# Patient Record
Sex: FEMALE | Race: WHITE | NOT HISPANIC OR LATINO | Employment: OTHER | URBAN - METROPOLITAN AREA
[De-identification: names, ages, dates, MRNs, and addresses within clinical notes are randomized per-mention and may not be internally consistent; named-entity substitution may affect disease eponyms.]

---

## 2017-05-09 ENCOUNTER — APPOINTMENT (OUTPATIENT)
Dept: RADIOLOGY | Facility: MEDICAL CENTER | Age: 52
End: 2017-05-09
Attending: EMERGENCY MEDICINE
Payer: MEDICARE

## 2017-05-09 ENCOUNTER — RESOLUTE PROFESSIONAL BILLING HOSPITAL PROF FEE (OUTPATIENT)
Dept: HOSPITALIST | Facility: MEDICAL CENTER | Age: 52
End: 2017-05-09
Payer: MEDICARE

## 2017-05-09 ENCOUNTER — HOSPITAL ENCOUNTER (OUTPATIENT)
Facility: MEDICAL CENTER | Age: 52
End: 2017-05-11
Attending: EMERGENCY MEDICINE | Admitting: INTERNAL MEDICINE
Payer: MEDICARE

## 2017-05-09 LAB
ANION GAP SERPL CALC-SCNC: 8 MMOL/L (ref 0–11.9)
BASOPHILS # BLD AUTO: 0.5 % (ref 0–1.8)
BASOPHILS # BLD: 0.03 K/UL (ref 0–0.12)
BNP SERPL-MCNC: 66 PG/ML (ref 0–100)
BUN SERPL-MCNC: 13 MG/DL (ref 8–22)
CALCIUM SERPL-MCNC: 8.8 MG/DL (ref 8.5–10.5)
CHLORIDE SERPL-SCNC: 96 MMOL/L (ref 96–112)
CO2 SERPL-SCNC: 30 MMOL/L (ref 20–33)
CREAT SERPL-MCNC: 1.19 MG/DL (ref 0.5–1.4)
EOSINOPHIL # BLD AUTO: 0.23 K/UL (ref 0–0.51)
EOSINOPHIL NFR BLD: 3.7 % (ref 0–6.9)
ERYTHROCYTE [DISTWIDTH] IN BLOOD BY AUTOMATED COUNT: 48.9 FL (ref 35.9–50)
GFR SERPL CREATININE-BSD FRML MDRD: 48 ML/MIN/1.73 M 2
GLUCOSE SERPL-MCNC: 199 MG/DL (ref 65–99)
HCT VFR BLD AUTO: 31.3 % (ref 37–47)
HGB BLD-MCNC: 10.3 G/DL (ref 12–16)
IMM GRANULOCYTES # BLD AUTO: 0.03 K/UL (ref 0–0.11)
IMM GRANULOCYTES NFR BLD AUTO: 0.5 % (ref 0–0.9)
LYMPHOCYTES # BLD AUTO: 1.53 K/UL (ref 1–4.8)
LYMPHOCYTES NFR BLD: 24.4 % (ref 22–41)
MCH RBC QN AUTO: 28.5 PG (ref 27–33)
MCHC RBC AUTO-ENTMCNC: 32.9 G/DL (ref 33.6–35)
MCV RBC AUTO: 86.7 FL (ref 81.4–97.8)
MONOCYTES # BLD AUTO: 0.42 K/UL (ref 0–0.85)
MONOCYTES NFR BLD AUTO: 6.7 % (ref 0–13.4)
NEUTROPHILS # BLD AUTO: 4.03 K/UL (ref 2–7.15)
NEUTROPHILS NFR BLD: 64.2 % (ref 44–72)
NRBC # BLD AUTO: 0 K/UL
NRBC BLD AUTO-RTO: 0 /100 WBC
PLATELET # BLD AUTO: 275 K/UL (ref 164–446)
PMV BLD AUTO: 8.9 FL (ref 9–12.9)
POTASSIUM SERPL-SCNC: 2.5 MMOL/L (ref 3.6–5.5)
RBC # BLD AUTO: 3.61 M/UL (ref 4.2–5.4)
SODIUM SERPL-SCNC: 134 MMOL/L (ref 135–145)
TROPONIN I SERPL-MCNC: <0.01 NG/ML (ref 0–0.04)
WBC # BLD AUTO: 6.3 K/UL (ref 4.8–10.8)

## 2017-05-09 PROCEDURE — 99285 EMERGENCY DEPT VISIT HI MDM: CPT

## 2017-05-09 PROCEDURE — 71010 DX-CHEST-PORTABLE (1 VIEW): CPT

## 2017-05-09 PROCEDURE — 84484 ASSAY OF TROPONIN QUANT: CPT

## 2017-05-09 PROCEDURE — 80048 BASIC METABOLIC PNL TOTAL CA: CPT

## 2017-05-09 PROCEDURE — 83880 ASSAY OF NATRIURETIC PEPTIDE: CPT

## 2017-05-09 PROCEDURE — 85025 COMPLETE CBC W/AUTO DIFF WBC: CPT

## 2017-05-09 PROCEDURE — 93005 ELECTROCARDIOGRAM TRACING: CPT | Performed by: EMERGENCY MEDICINE

## 2017-05-09 NOTE — IP AVS SNAPSHOT
" Home Care Instructions                                                                                                                  Name:Nohemy Baumann  Medical Record Number:[id number]  CSN: [id number]    YOB: 1965   Age: 52 y.o.  Sex: female  HT:1.6 m (5' 2.99\") WT: 82.4 kg (181 lb 10.5 oz)          Admit Date: 5/9/2017     Discharge Date:   Today's Date: 5/11/2017  Attending Doctor:  Brooks Rodriguez M.D.                  Allergies:  Nitroglycerin and Vancomycin            Discharge Instructions       Discharge Instructions    Discharged to home by car with relative. Discharged via wheelchair, hospital escort: Yes.  Special equipment needed: Not Applicable    Be sure to schedule a follow-up appointment with your primary care doctor or any specialists as instructed.     Discharge Plan:   Diet Plan: Discussed  Activity Level: Discussed  Confirmed Follow up Appointment: Appointment Scheduled  Confirmed Symptoms Management: Discussed  Medication Reconciliation Updated: Yes  Influenza Vaccine Indication: Patient Refuses    I understand that a diet low in cholesterol, fat, and sodium is recommended for good health. Unless I have been given specific instructions below for another diet, I accept this instruction as my diet prescription.   Other diet: Cardiac    Special Instructions: None    · Is patient discharged on Warfarin / Coumadin?   No     · Is patient Post Blood Transfusion?  No    Chest Pain, Nonspecific  It is often hard to give a specific diagnosis for the cause of chest pain. There is always a chance that your pain could be related to something serious, like a heart attack or a blood clot in the lungs. You need to follow up with your caregiver for further evaluation. More lab tests or other studies such as X-rays, electrocardiography, stress testing, or cardiac imaging may be needed to find the cause of your pain.  Most of the time, nonspecific chest pain improves within 2 to 3 days with rest and " mild pain medicine. For the next few days, avoid physical exertion or activities that bring on pain. Do not smoke. Avoid drinking alcohol. Call your caregiver for routine follow-up as advised.   SEEK IMMEDIATE MEDICAL CARE IF:  · You develop increased chest pain or pain that radiates to the arm, neck, jaw, back, or abdomen.   · You develop shortness of breath, increased coughing, or you start coughing up blood.   · You have severe back or abdominal pain, nausea, or vomiting.   · You develop severe weakness, fainting, fever, or chills.   Document Released: 12/18/2006 Document Revised: 03/11/2013 Document Reviewed: 06/06/2008  [email or web address]® Patient Information ©2013 [employer].    Depression / Suicide Risk    As you are discharged from this [facility] facility, it is important to learn how to keep safe from harming yourself.    Recognize the warning signs:  · Abrupt changes in personality, positive or negative- including increase in energy   · Giving away possessions  · Change in eating patterns- significant weight changes-  positive or negative  · Change in sleeping patterns- unable to sleep or sleeping all the time   · Unwillingness or inability to communicate  · Depression  · Unusual sadness, discouragement and loneliness  · Talk of wanting to die  · Neglect of personal appearance   · Rebelliousness- reckless behavior  · Withdrawal from people/activities they love  · Confusion- inability to concentrate     If you or a loved one observes any of these behaviors or has concerns about self-harm, here's what you can do:  · Talk about it- your feelings and reasons for harming yourself  · Remove any means that you might use to hurt yourself (examples: pills, rope, extension cords, firearm)  · Get professional help from the community (Mental Health, Substance Abuse, psychological counseling)  · Do not be alone:Call your Safe Contact- someone whom you trust who will be there for you.  · Call your local CRISIS HOTLINE  005-1860 or 199-497-6866  · Call your local Children's Mobile Crisis Response Team [place] (190) 348-4010 or www.LeadiD  · Call the toll free National Suicide Prevention Hotlines   · National Suicide Prevention Lifeline 867-515-JYHI (0111)  · National Hope Line Network 800-SUICIDE (336-0939)        Follow-up Information     1. Follow up with Dora Varela M.D.. Go on 5/15/2017.    Specialty:  Internal Medicine    Why:  PLEASE ARRIVE AT 8:30AM FOR A 9:00AM APPOINTMENT. THANK YOU    Contact information    [address]  [address]  [place] [address]  [phone]          2. Follow up with Abhinav Govea M.D..    Specialty:  Nephrology    Why:  Patient is already seen at dialysis, and will be seen by N.P. on 5/16/2017.    Contact information    [address] [place] [address]  [phone]           Discharge Medication Instructions:    Below are the medications your physician expects you to take upon discharge:    Review all your home medications and newly ordered medications with your doctor and/or pharmacist. Follow medication instructions as directed by your doctor and/or pharmacist.    Please keep your medication list with you and share with your physician.               Medication List      START taking these medications        Instructions    Morning Afternoon Evening Bedtime    aspirin 81 MG tablet   Last time this was given:  325 mg on 5/11/2017  9:36 AM   Next Dose Due:  5/12 AM        Take 4 Tabs by mouth every day.   Dose:  325 mg                          CHANGE how you take these medications        Instructions    Morning Afternoon Evening Bedtime    NOVOLOG (insulin aspart) 100 UNIT/ML Sopn injection   What changed:    - how to take this  - when to take this  - additional instructions   Commonly known as:  NOVOLOG FLEXPEN   Next Dose Due:  Prior to meals        Use TID AC per sliding scale.  70   - 150  mg/dL =      0 Units  151 - 200  mg/dL =    2 Units  201 - 250  mg/dL =    3 Units   251 - 300  mg/dL  =   5 Units  301 - 350  mg/dL  =   6 Units  351 - 400 mg/dL   =   8 Units  Over 400 mg/dL   =   9 Units                          CONTINUE taking these medications        Instructions    Morning Afternoon Evening Bedtime    LEVEMIR FLEXPEN 100 UNIT/ML Sopn injection   Generic drug:  insulin detemir   Next Dose Due:  5/11 PM        Inject 20 Units as instructed 2 times a day.   Dose:  20 Units                             Where to Get Your Medications      Information about where to get these medications is not yet available     ! Ask your nurse or doctor about these medications    - aspirin 81 MG tablet  - NOVOLOG (insulin aspart) 100 UNIT/ML Sopn injection            Instructions           Diet / Nutrition:    Follow any diet instructions given to you by your doctor or the dietician, including how much salt (sodium) you are allowed each day.    If you are overweight, talk to your doctor about a weight reduction plan.    Activity:    Remain physically active following your doctor's instructions about exercise and activity.    Rest often.     Any time you become even a little tired or short of breath, SIT DOWN and rest.    Worsening Symptoms:    Report any of the following signs and symptoms to the doctor's office immediately:    *Pain of jaw, arm, or neck  *Chest pain not relieved by medication                               *Dizziness or loss of consciousness  *Difficulty breathing even when at rest   *More tired than usual                                       *Bleeding drainage or swelling of surgical site  *Swelling of feet, ankles, legs or stomach                 *Fever (>100ºF)  *Pink or blood tinged sputum  *Weight gain (3lbs/day or 5lbs /week)           *Shock from internal defibrillator (if applicable)  *Palpitations or irregular heartbeats                *Cool and/or numb extremities    Stroke Awareness    Common Risk Factors for Stroke include:    Age  Atrial Fibrillation  Carotid Artery  Stenosis  Diabetes Mellitus  Excessive alcohol consumption  High blood pressure  Overweight   Physical inactivity  Smoking    Warning signs and symptoms of a stroke include:    *Sudden numbness or weakness of the face, arm or leg (especially on one side of the body).  *Sudden confusion, trouble speaking or understanding.  *Sudden trouble seeing in one or both eyes.  *Sudden trouble walking, dizziness, loss of balance or coordination.Sudden severe headache with no known cause.    It is very important to get treatment quickly when a stroke occurs. If you experience any of the above warning signs, call 911 immediately.                   Disclaimer         Quit Smoking / Tobacco Use:    I understand the use of any tobacco products increases my chance of suffering from future heart disease or stroke and could cause other illnesses which may shorten my life. Quitting the use of tobacco products is the single most important thing I can do to improve my health. For further information on smoking / tobacco cessation call a Toll Free Quit Line at 1-864.463.8364 (*National Cancer [place]) or 1-779.790.6868 (American Lung Association) or you can access the web based program at www.lungDreamzer Games.org.    [place] Tobacco Users Help Line:  (689) 934-2839       Toll Free: 1-861.585.3182  Quit Tobacco Program [facility] Management Services (212)881-5469    Crisis Hotline:    [place] Crisis Hotline:  3-203-KWVJAQR or 1-550.290.7097    [place] Crisis Hotline:    1-406.761.6524 or 108-667-3913    Discharge Survey:   Thank you for choosing [facility]. We hope we did everything we could to make your hospital stay a pleasant one. You may be receiving a phone survey and we would appreciate your time and participation in answering the questions. Your input is very valuable to us in our efforts to improve our service to our patients and their families.        My signature on this form indicates that:    1. I have reviewed and understand the  above information.  2. My questions regarding this information have been answered to my satisfaction.  3. I have formulated a plan with my discharge nurse to obtain my prescribed medications for home.                  Disclaimer         __________________________________                     __________       ________                       Patient Signature                                                 Date                    Time

## 2017-05-09 NOTE — IP AVS SNAPSHOT
5/11/2017    Nohemy Baumann  [address] [place] [address]    Dear Nohemy:    [facility] wants to ensure your discharge home is safe and you or your loved ones have had all of your questions answered regarding your care after you leave the hospital.    Below is a list of resources and contact information should you have any questions regarding your hospital stay, follow-up instructions, or active medical symptoms.    Questions or Concerns Regarding… Contact   Medical Questions Related to Your Discharge  (7 days a week, 8am-5pm) Contact a Nurse Care Coordinator   [phone]   Medical Questions Not Related to Your Discharge  (24 hours a day / 7 days a week)  Contact the Nurse Health Line   [phone]    Medications or Discharge Instructions Refer to your discharge packet   or contact your [facility] Primary Care Provider   [phone]   Follow-up Appointment(s) Schedule your appointment via ServiceNow   or contact Scheduling [phone]   Billing Review your statement via ServiceNow  or contact Billing [phone]   Medical Records Review your records via ServiceNow   or contact Medical Records [phone]     You may receive a telephone call within two days of discharge. This call is to make certain you understand your discharge instructions and have the opportunity to have any questions answered. You can also easily access your medical information, test results and upcoming appointments via the ServiceNow free online health management tool. You can learn more and sign up at [employer]/ServiceNow. For assistance setting up your ServiceNow account, please call [phone].    Once again, we want to ensure your discharge home is safe and that you have a clear understanding of any next steps in your care. If you have any questions or concerns, please do not hesitate to contact us, we are here for you. Thank you for choosing [facility] for your healthcare needs.    Sincerely,    Your [facility] Healthcare Team

## 2017-05-09 NOTE — IP AVS SNAPSHOT
" <p align=\"LEFT\"><IMG SRC=\"//[email or web address]/blob$/[email or web address]\" alt=\"Image\" WIDTH=\"50%\" HEIGHT=\"200\" BORDER=\"\"></p>                   Name:Nohemy Baumann  Medical Record Number:[id number]  CSN: [id number]    YOB: 1965   Age: 52 y.o.  Sex: female  HT:1.6 m (5' 2.99\") WT: 82.4 kg (181 lb 10.5 oz)          Admit Date: 5/9/2017     Discharge Date:   Today's Date: 5/11/2017  Attending Doctor:  Brooks Rodriguez M.D.                  Allergies:  Nitroglycerin and Vancomycin          Follow-up Information     1. Follow up with Dora Varela M.D.. Go on 5/15/2017.    Specialty:  Internal Medicine    Why:  PLEASE ARRIVE AT 8:30AM FOR A 9:00AM APPOINTMENT. THANK YOU    Contact information    [address]  [place] [id number] [phone]          2. Follow up with Abhinav Govea M.D..    Specialty:  Nephrology    Why:  Patient is already seen at dialysis, and will be seen by N.P. on 5/16/2017.    Contact information    [facility] [address] [id number] [phone]           Medication List      Take these Medications        Instructions    aspirin 81 MG tablet    Take 4 Tabs by mouth every day.   Dose:  325 mg       LEVEMIR FLEXPEN 100 UNIT/ML Sopn injection   Generic drug:  insulin detemir    Inject 20 Units as instructed 2 times a day.   Dose:  20 Units       NOVOLOG (insulin aspart) 100 UNIT/ML Sopn injection   What changed:    - how to take this  - when to take this  - additional instructions   Commonly known as:  NOVOLOG FLEXPEN    Use TID AC per sliding scale.  70   - 150  mg/dL =      0 Units  151 - 200  mg/dL =    2 Units  201 - 250  mg/dL =    3 Units  251 - 300  mg/dL  =   5 Units  301 - 350  mg/dL  =   6 Units  351 - 400 mg/dL   =   8 Units  Over 400 mg/dL   =   9 Units         "

## 2017-05-09 NOTE — IP AVS SNAPSHOT
[email or web address] Access Code: [email or web address]  Expires: 6/10/2017  3:54 PM    Your email address is not on file at [facility].  Email Addresses are required for you to sign up for [email or web address], please contact [phone] to verify your personal information and to provide your email address prior to attempting to register for [email or web address].    Nohemy Pastor  [address] [place] [address]    apta.me  A secure, online tool to manage your health information     [facility]’s apta.me® is a secure, online tool that connects you to your personalized health information from the privacy of your home -- day or night - making it very easy for you to manage your healthcare. Once the activation process is completed, you can even access your medical information using the apta.me dheeraj, which is available for free in the Apple Dheeraj store or Google Play store.     To learn more about apta.me, visit [email or web address]    There are two levels of access available (as shown below):   My Chart Features  [facility] Primary Care Doctor [facility]  Specialists [facility]  Urgent  Care Non-[facility] Primary Care Doctor   Email your healthcare team securely and privately 24/7 X X X    Manage appointments: schedule your next appointment; view details of past/upcoming appointments X      Request prescription refills. X      View recent personal medical records, including lab and immunizations X X X X   View health record, including health history, allergies, medications X X X X   Read reports about your outpatient visits, procedures, consult and ER notes X X X X   See your discharge summary, which is a recap of your hospital and/or ER visit that includes your diagnosis, lab results, and care plan X X  X     How to register for [employer]:  Once your e-mail address has been verified, follow the following steps to sign up for [email or web address].     1. Go to  [email or web address]  2. Click on the Sign Up Now box, which takes you to the New Member Sign Up page. You will need to  provide the following information:  a. Enter your JetSuite Access Code exactly as it appears at the top of this page. (You will not need to use this code after you’ve completed the sign-up process. If you do not sign up before the expiration date, you must request a new code.)   b. Enter your date of birth.   c. Enter your home email address.   d. Click Submit, and follow the next screen’s instructions.  3. Create a JetSuite ID. This will be your JetSuite login ID and cannot be changed, so think of one that is secure and easy to remember.  4. Create a JetSuite password. You can change your password at any time.  5. Enter your Password Reset Question and Answer. This can be used at a later time if you forget your password.   6. Enter your e-mail address. This allows you to receive e-mail notifications when new information is available in JetSuite.  7. Click Sign Up. You can now view your health information.    For assistance activating your JetSuite account, call [phone]

## 2017-05-10 ENCOUNTER — APPOINTMENT (OUTPATIENT)
Dept: RADIOLOGY | Facility: MEDICAL CENTER | Age: 52
End: 2017-05-10
Attending: INTERNAL MEDICINE
Payer: MEDICARE

## 2017-05-10 PROBLEM — R07.9 CHEST PAIN: Status: ACTIVE | Noted: 2017-05-10

## 2017-05-10 LAB
GLUCOSE BLD-MCNC: 147 MG/DL (ref 65–99)
GLUCOSE BLD-MCNC: 157 MG/DL (ref 65–99)
GLUCOSE BLD-MCNC: 160 MG/DL (ref 65–99)
GLUCOSE BLD-MCNC: 175 MG/DL (ref 65–99)
LV EJECT FRACT  99904: 65
LV EJECT FRACT MOD 2C 99903: 67.06
LV EJECT FRACT MOD 4C 99902: 72.69
LV EJECT FRACT MOD BP 99901: 68.48
TROPONIN I SERPL-MCNC: 0.01 NG/ML (ref 0–0.04)

## 2017-05-10 PROCEDURE — A9502 TC99M TETROFOSMIN: HCPCS

## 2017-05-10 PROCEDURE — 700111 HCHG RX REV CODE 636 W/ 250 OVERRIDE (IP): Performed by: EMERGENCY MEDICINE

## 2017-05-10 PROCEDURE — 84484 ASSAY OF TROPONIN QUANT: CPT

## 2017-05-10 PROCEDURE — 82962 GLUCOSE BLOOD TEST: CPT

## 2017-05-10 PROCEDURE — 700111 HCHG RX REV CODE 636 W/ 250 OVERRIDE (IP)

## 2017-05-10 PROCEDURE — G0378 HOSPITAL OBSERVATION PER HR: HCPCS

## 2017-05-10 PROCEDURE — 93306 TTE W/DOPPLER COMPLETE: CPT

## 2017-05-10 PROCEDURE — A9270 NON-COVERED ITEM OR SERVICE: HCPCS | Performed by: INTERNAL MEDICINE

## 2017-05-10 PROCEDURE — 99220 PR INITIAL OBSERVATION CARE,LEVL III: CPT | Performed by: INTERNAL MEDICINE

## 2017-05-10 PROCEDURE — 700111 HCHG RX REV CODE 636 W/ 250 OVERRIDE (IP): Performed by: INTERNAL MEDICINE

## 2017-05-10 PROCEDURE — 96372 THER/PROPH/DIAG INJ SC/IM: CPT | Mod: XU

## 2017-05-10 PROCEDURE — 700102 HCHG RX REV CODE 250 W/ 637 OVERRIDE(OP): Performed by: INTERNAL MEDICINE

## 2017-05-10 PROCEDURE — 96374 THER/PROPH/DIAG INJ IV PUSH: CPT | Mod: XU

## 2017-05-10 PROCEDURE — 93306 TTE W/DOPPLER COMPLETE: CPT | Mod: 26 | Performed by: INTERNAL MEDICINE

## 2017-05-10 PROCEDURE — 36415 COLL VENOUS BLD VENIPUNCTURE: CPT

## 2017-05-10 PROCEDURE — 94760 N-INVAS EAR/PLS OXIMETRY 1: CPT

## 2017-05-10 PROCEDURE — 96376 TX/PRO/DX INJ SAME DRUG ADON: CPT | Mod: XU

## 2017-05-10 RX ORDER — POLYETHYLENE GLYCOL 3350 17 G/17G
1 POWDER, FOR SOLUTION ORAL
Status: DISCONTINUED | OUTPATIENT
Start: 2017-05-10 | End: 2017-05-11 | Stop reason: HOSPADM

## 2017-05-10 RX ORDER — AMOXICILLIN 250 MG
2 CAPSULE ORAL 2 TIMES DAILY
Status: DISCONTINUED | OUTPATIENT
Start: 2017-05-10 | End: 2017-05-11 | Stop reason: HOSPADM

## 2017-05-10 RX ORDER — ASPIRIN 300 MG/1
300 SUPPOSITORY RECTAL DAILY
Status: DISCONTINUED | OUTPATIENT
Start: 2017-05-10 | End: 2017-05-11 | Stop reason: HOSPADM

## 2017-05-10 RX ORDER — BISACODYL 10 MG
10 SUPPOSITORY, RECTAL RECTAL
Status: DISCONTINUED | OUTPATIENT
Start: 2017-05-10 | End: 2017-05-11 | Stop reason: HOSPADM

## 2017-05-10 RX ORDER — INSULIN GLARGINE 100 [IU]/ML
20 INJECTION, SOLUTION SUBCUTANEOUS EVERY EVENING
Status: DISCONTINUED | OUTPATIENT
Start: 2017-05-10 | End: 2017-05-11 | Stop reason: HOSPADM

## 2017-05-10 RX ORDER — DEXTROSE MONOHYDRATE 25 G/50ML
25 INJECTION, SOLUTION INTRAVENOUS
Status: DISCONTINUED | OUTPATIENT
Start: 2017-05-10 | End: 2017-05-11 | Stop reason: HOSPADM

## 2017-05-10 RX ORDER — HEPARIN SODIUM 5000 [USP'U]/ML
5000 INJECTION, SOLUTION INTRAVENOUS; SUBCUTANEOUS EVERY 8 HOURS
Status: DISCONTINUED | OUTPATIENT
Start: 2017-05-10 | End: 2017-05-11 | Stop reason: HOSPADM

## 2017-05-10 RX ORDER — ASPIRIN 81 MG/1
324 TABLET, CHEWABLE ORAL DAILY
Status: DISCONTINUED | OUTPATIENT
Start: 2017-05-10 | End: 2017-05-11 | Stop reason: HOSPADM

## 2017-05-10 RX ORDER — ONDANSETRON 2 MG/ML
4 INJECTION INTRAMUSCULAR; INTRAVENOUS EVERY 4 HOURS PRN
Status: DISCONTINUED | OUTPATIENT
Start: 2017-05-10 | End: 2017-05-11 | Stop reason: HOSPADM

## 2017-05-10 RX ORDER — ASPIRIN 325 MG
325 TABLET ORAL DAILY
Status: DISCONTINUED | OUTPATIENT
Start: 2017-05-10 | End: 2017-05-11 | Stop reason: HOSPADM

## 2017-05-10 RX ORDER — REGADENOSON 0.08 MG/ML
INJECTION, SOLUTION INTRAVENOUS
Status: COMPLETED
Start: 2017-05-10 | End: 2017-05-10

## 2017-05-10 RX ORDER — ONDANSETRON 2 MG/ML
4 INJECTION INTRAMUSCULAR; INTRAVENOUS ONCE
Status: COMPLETED | OUTPATIENT
Start: 2017-05-10 | End: 2017-05-10

## 2017-05-10 RX ORDER — POTASSIUM CHLORIDE 20 MEQ/1
40 TABLET, EXTENDED RELEASE ORAL 2 TIMES DAILY
Status: COMPLETED | OUTPATIENT
Start: 2017-05-10 | End: 2017-05-10

## 2017-05-10 RX ADMIN — ASPIRIN 325 MG: 325 TABLET, COATED ORAL at 08:20

## 2017-05-10 RX ADMIN — ONDANSETRON 4 MG: 2 INJECTION, SOLUTION INTRAMUSCULAR; INTRAVENOUS at 00:45

## 2017-05-10 RX ADMIN — STANDARDIZED SENNA CONCENTRATE AND DOCUSATE SODIUM 2 TABLET: 8.6; 5 TABLET, FILM COATED ORAL at 04:35

## 2017-05-10 RX ADMIN — HEPARIN SODIUM 5000 UNITS: 5000 INJECTION, SOLUTION INTRAVENOUS; SUBCUTANEOUS at 20:01

## 2017-05-10 RX ADMIN — INSULIN LISPRO 2 UNITS: 100 INJECTION, SOLUTION INTRAVENOUS; SUBCUTANEOUS at 20:00

## 2017-05-10 RX ADMIN — POTASSIUM CHLORIDE 40 MEQ: 1500 TABLET, EXTENDED RELEASE ORAL at 08:20

## 2017-05-10 RX ADMIN — INSULIN LISPRO 2 UNITS: 100 INJECTION, SOLUTION INTRAVENOUS; SUBCUTANEOUS at 13:32

## 2017-05-10 RX ADMIN — INSULIN LISPRO 2 UNITS: 100 INJECTION, SOLUTION INTRAVENOUS; SUBCUTANEOUS at 18:36

## 2017-05-10 RX ADMIN — HEPARIN SODIUM 5000 UNITS: 5000 INJECTION, SOLUTION INTRAVENOUS; SUBCUTANEOUS at 04:34

## 2017-05-10 RX ADMIN — REGADENOSON 0.4 MG: 0.08 INJECTION, SOLUTION INTRAVENOUS at 11:43

## 2017-05-10 RX ADMIN — POTASSIUM CHLORIDE 40 MEQ: 1500 TABLET, EXTENDED RELEASE ORAL at 20:02

## 2017-05-10 RX ADMIN — ONDANSETRON 4 MG: 2 INJECTION INTRAMUSCULAR; INTRAVENOUS at 20:09

## 2017-05-10 RX ADMIN — POTASSIUM CHLORIDE 40 MEQ: 1500 TABLET, EXTENDED RELEASE ORAL at 01:05

## 2017-05-10 RX ADMIN — HEPARIN SODIUM 5000 UNITS: 5000 INJECTION, SOLUTION INTRAVENOUS; SUBCUTANEOUS at 13:32

## 2017-05-10 RX ADMIN — INSULIN GLARGINE 20 UNITS: 100 INJECTION, SOLUTION SUBCUTANEOUS at 20:56

## 2017-05-10 RX ADMIN — INSULIN GLARGINE 20 UNITS: 100 INJECTION, SOLUTION SUBCUTANEOUS at 02:08

## 2017-05-10 RX ADMIN — ONDANSETRON 4 MG: 2 INJECTION INTRAMUSCULAR; INTRAVENOUS at 13:30

## 2017-05-10 ASSESSMENT — COPD QUESTIONNAIRES
DURING THE PAST 4 WEEKS HOW MUCH DID YOU FEEL SHORT OF BREATH: MOST  OR ALL OF THE TIME
DO YOU EVER COUGH UP ANY MUCUS OR PHLEGM?: NO/ONLY WITH OCCASIONAL COLDS OR INFECTIONS
COPD SCREENING SCORE: 6
HAVE YOU SMOKED AT LEAST 100 CIGARETTES IN YOUR ENTIRE LIFE: YES

## 2017-05-10 ASSESSMENT — PATIENT HEALTH QUESTIONNAIRE - PHQ9
SUM OF ALL RESPONSES TO PHQ9 QUESTIONS 1 AND 2: 0
SUM OF ALL RESPONSES TO PHQ QUESTIONS 1-9: 0
2. FEELING DOWN, DEPRESSED, IRRITABLE, OR HOPELESS: NOT AT ALL
1. LITTLE INTEREST OR PLEASURE IN DOING THINGS: NOT AT ALL

## 2017-05-10 ASSESSMENT — PAIN SCALES - GENERAL
PAINLEVEL_OUTOF10: 0
PAINLEVEL_OUTOF10: 0

## 2017-05-10 ASSESSMENT — LIFESTYLE VARIABLES
EVER_SMOKED: YES
EVER_SMOKED: YES
ALCOHOL_USE: NO

## 2017-05-10 NOTE — PROGRESS NOTES
Transport arrived to take patient to nuclear medicine for cardiac stress test. Pt. Leaving the floor in wheelchair with nasal canula and oxygen at 2 liters
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
## 2017-05-10 NOTE — CARE PLAN
Problem: Nutritional:  Goal: Patient to verbalize or demonstrate understanding of diet  Outcome: NOT MET  Pt needs reinforcement.

## 2017-05-10 NOTE — PROGRESS NOTES
12 Hr chart check.  Telemetry thru the noc .22/.16/.40 SR BBB 1sr degree AVB 88 rate.   NPPO for planned testing today.  Resting w/ HOB elevated.  O2 at 1.5 lpm n/c

## 2017-05-10 NOTE — PROGRESS NOTES
Called [facility] pharmacy who stated that the patient has not filled medications since January of 2017, but that the patient was receiving humulin R 10 u TID, humulin N 40 u BID, omeprazole 20mg QD, and xarelto 20mg QD post 15mg starter pack. Patient states she was at [facility], left message for medical records to please call back and verify medications taken under care there.

## 2017-05-10 NOTE — ED NOTES
Med rec unable to obtain at this time  Pt states she was discharged from [facility] on 5/1  Pt states she fills at the [facility] off [address]  Pharmacy is currently closed  Will have [facility] follow up

## 2017-05-10 NOTE — ED PROVIDER NOTES
"ED Provider Note    Scribed for Tish Singh M.D. by Colleen Perkins. 5/9/2017, 9:54 PM.    Primary care provider: Dr. Gudino, nephrology   Means of arrival: Jazmin   History obtained from: Patient  History limited by: None     CHIEF COMPLAINT  Chief Complaint   Patient presents with   • Chest Pain       HPI  Nohemy Baumann is a 52 y.o. female who presents to the Emergency Department due to intermittent chest pain onset 15 hours prior to exam. Patient's pain began while she was receiving dialysis treatment. She describes the pain as a \"sharp, shooting\" pain in the chest. She notes experiencing similar symptoms previously, although she did not seek care at that time. She reports associated lower extremity edema onset one week ago. Patient also notes chronic nausea that is currently equal to her normal baseline. She denies difficulty breathing. Patient is on home oxygen but she states she has been weaning herself off her oxygen. She reports prior heart attack and use of Eliquis. She states her current pain feels different from her previous heart attack. She denies fever or chills.     REVIEW OF SYSTEMS  See HPI for further details. All other systems are negative. C     PAST MEDICAL HISTORY   Patient is on dialysis.     SURGICAL HISTORY  patient denies any surgical history    SOCIAL HISTORY  Patient is employed.     FAMILY HISTORY  None noted.     CURRENT MEDICATIONS  Reviewed. See Encounter Summary.     ALLERGIES  Allergies   Allergen Reactions   • Nitroglycerin Hives   • Vancomycin Hives       PHYSICAL EXAM  VITAL SIGNS: /79 mmHg  Pulse 90  Temp(Src) 2.2 °C (36 °F)  Resp 18  Ht 1.6 m (5' 2.99\")  Wt 81.647 kg (180 lb)  BMI 31.89 kg/m2  SpO2 94%   Pulse ox interpretation: I interpret this pulse ox as normal.  Constitutional: Alert in no apparent distress.  HENT: No signs of trauma, Bilateral external ears normal, Nose normal.   Eyes: Pupils are equal and reactive, Conjunctiva normal, Non-icteric.   Neck: " Normal range of motion, No tenderness, Supple, No stridor.   Lymphatic: No lymphadenopathy noted.   Cardiovascular: Regular rate and rhythm, no murmurs.   Thorax & Lungs: Normal breath sounds, No respiratory distress, No wheezing, No chest tenderness. Port in the right chest which is clean, dry, and intact without erythema or tenderness.   Abdomen: Bowel sounds normal, Soft, No tenderness, No masses, No pulsatile masses. No peritoneal signs.  Skin: Warm, Dry, No erythema, No rash.   Back: No bony tenderness, No CVA tenderness.   Extremities: Intact distal pulses, No tenderness, No cyanosis,  Negative Ariana's sign. 3+ pitting edema.   Musculoskeletal: Good range of motion in all major joints. No tenderness to palpation or major deformities noted.   Neurologic: Alert , Normal motor function, Normal sensory function, No focal deficits noted.   Psychiatric: Affect normal, Judgment normal, Mood normal.     DIFFERENTIAL DIAGNOSIS AND WORK UP PLAN    9:54 PM Patient seen and examined at bedside. The patient presents with chest pain and the differential diagnosis includes but is not limited to hypertensive urgency, acute coronary syndrome, gastritis, costochondritis, immune acquired pneumonia, effusion. Ordered for chest x-ray, CBC with differential, BMP, troponin, BNP, and EKG to evaluate.     LABS   CBC with a hemoglobin of 10 and otherwise BMP with the hypokalemia and normal creatinine and troponin negative    All labs were reviewed by me.    EKG  12 Lead EKG interpreted by me to show:  Normal sinus rhythm with a rate of 89. Right bundle branch block unchanged from prior in March 2017. First degree AV block, intervals otherwise normal.     RADIOLOGY  DX-CHEST-PORTABLE (1 VIEW)   Final Result      Cardiomegaly.      The radiologist's interpretation of all radiological studies have been reviewed by me.    COURSE & MEDICAL DECISION MAKING  Pertinent Labs & Imaging studies reviewed. (See chart for details)    11:06 PM Review  of medical records shows patient is usually on Tuesday, Thursday, Saturday dialysis at [facility]. She had a pericardial effusion in March. Her nephrologist is Dr. Gudino.     11:34 PM Paged hospitalist.      11:49 PM Spoke with Dr. Anton, hospitalist, concerning patient case. Agreed to admit patient for further treatment and evaluation.     11:53 PM Recheck: Patient is resting comfortably and reports feeling improved. I updated her on her results and explained that she will be admitted for further care and evaluation. I explained that dialysis places the patient at increased risk for various other diseases. She understands and agrees.      This is a 52 y.o. year old female who presents with chest pain shortness of breath, according to her medical records she did have a pericardial effusion a few months ago and she did go to dialysis today. However in the setting of chest pain and increased risk patient will be admitted to the hospital for chest pain rule out.    DISPOSITION:  Patient will be admitted to Dr. Anton in guarded condition.      FINAL IMPRESSION  1. Chest pain  2. Hypokalemia     Colleen SCOTT (Scrprachi), am scribing for, and in the presence of, Tish Singh M.D..    Electronically signed by: Colleen Perkins (Scribe), 5/9/2017    Tish SCOTT M.D. personally performed the services described in this documentation, as scribed by Colleen Perkins in my presence, and it is both accurate and complete.    The note accurately reflects work and decisions made by me.  Tish Singh  5/10/2017  2:10 AM    This dictation has been created using voice recognition software and/or scribes. The accuracy of the dictation is limited by the abilities of the software and the expertise of the scribes. I expect there may be some errors of grammar and possibly content. I made every attempt to manually correct the errors within my dictation. However, errors related to voice recognition software and/or scribes may still  exist and should be interpreted within the appropriate context.

## 2017-05-10 NOTE — H&P
CHIEF COMPLAINT:  Chest pain.    HISTORY OF PRESENT ILLNESS:  Patient is a 52-year-old female with history of   end-stage renal disease who comes today to ER complaining of a chest pain.  As   per patient, it started on this morning, mostly sharp pain in the left side   of the chest and is going on, seems 7/10 in intensity, nonradiating anywhere.    She is also having some lightheadedness.  Denies any shortness of breath.  No   fever or chills.  No nausea or vomiting.  No abdominal pain.    REVIEW OF SYSTEMS:  All negative except as per HPI.    PAST MEDICAL HISTORY:  History of end-stage renal disease, hypertension,   diabetes, and dyslipidemia.    PAST SURGICAL HISTORY:  History of pituitary tumor removal, history of   , cholecystectomy.    FAMILY HISTORY:  Father with history of diabetes.    ALLERGIES:  ALLERGIC TO VANCOMYCIN AND NITRO PATCH.    SOCIAL HISTORY:  Denies smoking, alcohol or drug use.    PHYSICAL EXAMINATION:  VITAL SIGNS:  Blood pressure 159/79, respirations 18, pulse 90.  GENERAL:  No acute distress, nontoxic appearance.  HEENT:  Normocephalic, atraumatic.  Pupils are equal, round and reactive to   light.  NECK:  Supple, no adenopathy.  CARDIOVASCULAR:  S1, S2, normal.  No gallops appreciated.  LUNGS:  Clear to auscultation bilaterally.  No rales, rhonchi or wheezing.  ABDOMEN:  Soft, nontender, positive bowel sounds.  EXTREMITIES:  +1 edema.  No clubbing or cyanosis.  NEUROLOGIC:  No focal deficit.  Alert and oriented x4.    LABORATORY WORK:  WBC 6.3, hemoglobin 10.3, hematocrit 31.3, platelets 275.    Sodium 134, potassium 2.5, chloride 96, bicarbonate 30, glucose 109, BUN 15,   creatinine 1.19.    IMAGING:  Chest x-ray showing some cardiomegaly.    ASSESSMENT AND PLAN:  1.  Chest pain, going on since this morning at 06:30 a.m.  Her troponin has   been negative.  We will continue to trend troponins and do a stress test in   the morning and we will monitor patient on tele.  2.  End-stage  renal disease.  Patient had dialysis before coming to the ER.    She follows up with Dr. Gudino at [facility].  We will inform   nephrology in the morning that patient has been admitted.  3.  Hypokalemia.  We will start repleting, probably due to dialysis that the   patient had today.  4.  Diabetes.  We will continue her Lantus and also we will place patient on   sliding scale and Accu-Cheks.  Continue to monitor.  5.  Prophylaxis, deep venous thrombosis.  We will start patient on heparin   subQ.  6.  Code status:  Patient is a full code.       ____________________________________     MD NASH GOMEZ / NTS    DD:  05/10/2017 01:03:17  DT:  05/10/2017 02:13:46    D#:  [id number]  Job#:  [id number]

## 2017-05-10 NOTE — ED NOTES
Pt states having dialysis today and was having chest pain to left chest intermittently. Also having some SOB more than normal during the day today. No nausea or sweats. States just feeling more anxious all day.

## 2017-05-10 NOTE — PROGRESS NOTES
- seen and admitted by my partner, Dr. Anton this morning, here for CP rule-out.   - A&P per Dr. Anton's H&P. Await echo. Stress test showed small apical infarct, but no reversible ischemia. Troponins remained negative. Will monitor on tele overnight to evaluate for recurrent CP/arrhythmia.

## 2017-05-10 NOTE — ED NOTES
Pt states feeling nausea coming on. Requesting medication for that . Dr. Singh contacted and Zofran ordered for patient.

## 2017-05-10 NOTE — CARE PLAN
Problem: Safety  Goal: Will remain free from injury  Outcome: PROGRESSING AS EXPECTED  Bed locked in low position, call light in reach, treaded socks on.        Problem: Knowledge Deficit  Goal: Knowledge of disease process/condition, treatment plan, diagnostic tests, and medications will improve  Outcome: PROGRESSING AS EXPECTED  POC discussed, pt verbalizes understanding.  NPO status for tests understood, meds discussed w/ pt teachback

## 2017-05-10 NOTE — ED NOTES
Pt feeling better after zofran. Attempted potassium orally and patient tolerated it but had a small emesis of clear liquid and dry heaves. No pill fragments seen with emesis.

## 2017-05-10 NOTE — ED NOTES
call from Lab called with critical result of potassium 2.5 at 2315 Critical lab result read back to Delvin BELTRÁN.   Dr. Singh notified of critical lab result at 2320  Critical lab result read back by Dr. Singh.

## 2017-05-10 NOTE — PROGRESS NOTES
Pt admitted from ER via gurney.  Assumed pt care, assessment complete.  Oriented to room/controls, needs met at this time, bed alarm armed, belongings and call light in reach treaded socks on, bed in low position. Pivoted from cart to bed, denies pain.  On tele SR

## 2017-05-10 NOTE — PROGRESS NOTES
Received bedside report. Assumed care of patient. Patient asleep in bed. Bed in lowest position, call light within reach. Hourly rounding in place. Will continue to monitor.

## 2017-05-11 VITALS
BODY MASS INDEX: 32.19 KG/M2 | WEIGHT: 181.66 LBS | SYSTOLIC BLOOD PRESSURE: 164 MMHG | TEMPERATURE: 96.9 F | HEIGHT: 63 IN | OXYGEN SATURATION: 98 % | DIASTOLIC BLOOD PRESSURE: 87 MMHG | HEART RATE: 91 BPM | RESPIRATION RATE: 18 BRPM

## 2017-05-11 PROBLEM — E11.9 TYPE 2 DIABETES MELLITUS (HCC): Status: ACTIVE | Noted: 2017-05-11

## 2017-05-11 PROBLEM — N18.30 CKD (CHRONIC KIDNEY DISEASE), STAGE III: Status: ACTIVE | Noted: 2017-05-11

## 2017-05-11 PROBLEM — N18.5 CKD (CHRONIC KIDNEY DISEASE), STAGE V (HCC): Status: ACTIVE | Noted: 2017-05-11

## 2017-05-11 PROBLEM — I25.2 MYOCARDIAL INFARCT, OLD: Status: ACTIVE | Noted: 2017-05-11

## 2017-05-11 PROBLEM — E87.6 HYPOKALEMIA: Status: ACTIVE | Noted: 2017-05-11

## 2017-05-11 PROBLEM — R07.9 CHEST PAIN: Status: RESOLVED | Noted: 2017-05-10 | Resolved: 2017-05-11

## 2017-05-11 LAB
ANION GAP SERPL CALC-SCNC: 7 MMOL/L (ref 0–11.9)
BUN SERPL-MCNC: 13 MG/DL (ref 8–22)
CALCIUM SERPL-MCNC: 8.6 MG/DL (ref 8.5–10.5)
CHLORIDE SERPL-SCNC: 103 MMOL/L (ref 96–112)
CO2 SERPL-SCNC: 32 MMOL/L (ref 20–33)
CREAT SERPL-MCNC: 1.34 MG/DL (ref 0.5–1.4)
ERYTHROCYTE [DISTWIDTH] IN BLOOD BY AUTOMATED COUNT: 48.8 FL (ref 35.9–50)
GFR SERPL CREATININE-BSD FRML MDRD: 42 ML/MIN/1.73 M 2
GLUCOSE BLD-MCNC: 101 MG/DL (ref 65–99)
GLUCOSE BLD-MCNC: 159 MG/DL (ref 65–99)
GLUCOSE BLD-MCNC: 166 MG/DL (ref 65–99)
GLUCOSE BLD-MCNC: 86 MG/DL (ref 65–99)
GLUCOSE SERPL-MCNC: 104 MG/DL (ref 65–99)
HCT VFR BLD AUTO: 32.6 % (ref 37–47)
HGB BLD-MCNC: 10.7 G/DL (ref 12–16)
MAGNESIUM SERPL-MCNC: 1.6 MG/DL (ref 1.5–2.5)
MCH RBC QN AUTO: 28.3 PG (ref 27–33)
MCHC RBC AUTO-ENTMCNC: 32.8 G/DL (ref 33.6–35)
MCV RBC AUTO: 86.2 FL (ref 81.4–97.8)
PLATELET # BLD AUTO: 263 K/UL (ref 164–446)
PMV BLD AUTO: 8.6 FL (ref 9–12.9)
POTASSIUM SERPL-SCNC: 2.8 MMOL/L (ref 3.6–5.5)
RBC # BLD AUTO: 3.78 M/UL (ref 4.2–5.4)
SODIUM SERPL-SCNC: 142 MMOL/L (ref 135–145)
WBC # BLD AUTO: 6.1 K/UL (ref 4.8–10.8)

## 2017-05-11 PROCEDURE — 82962 GLUCOSE BLOOD TEST: CPT

## 2017-05-11 PROCEDURE — A9270 NON-COVERED ITEM OR SERVICE: HCPCS | Performed by: INTERNAL MEDICINE

## 2017-05-11 PROCEDURE — 85027 COMPLETE CBC AUTOMATED: CPT

## 2017-05-11 PROCEDURE — 700111 HCHG RX REV CODE 636 W/ 250 OVERRIDE (IP): Performed by: INTERNAL MEDICINE

## 2017-05-11 PROCEDURE — 80048 BASIC METABOLIC PNL TOTAL CA: CPT

## 2017-05-11 PROCEDURE — 700102 HCHG RX REV CODE 250 W/ 637 OVERRIDE(OP): Performed by: INTERNAL MEDICINE

## 2017-05-11 PROCEDURE — 99217 PR OBSERVATION CARE DISCHARGE: CPT | Performed by: INTERNAL MEDICINE

## 2017-05-11 PROCEDURE — 83735 ASSAY OF MAGNESIUM: CPT

## 2017-05-11 PROCEDURE — G0378 HOSPITAL OBSERVATION PER HR: HCPCS

## 2017-05-11 RX ORDER — FUROSEMIDE 20 MG/1
20 TABLET ORAL 2 TIMES DAILY
COMMUNITY

## 2017-05-11 RX ORDER — ATORVASTATIN CALCIUM 80 MG/1
80 TABLET, FILM COATED ORAL NIGHTLY
COMMUNITY

## 2017-05-11 RX ORDER — AMLODIPINE BESYLATE 5 MG/1
5 TABLET ORAL DAILY
COMMUNITY

## 2017-05-11 RX ORDER — ERGOCALCIFEROL 1.25 MG/1
50000 CAPSULE ORAL
COMMUNITY
End: 2017-06-05

## 2017-05-11 RX ORDER — POTASSIUM CHLORIDE 1.5 G/1.58G
20 POWDER, FOR SOLUTION ORAL ONCE
Status: COMPLETED | OUTPATIENT
Start: 2017-05-11 | End: 2017-05-11

## 2017-05-11 RX ORDER — METOCLOPRAMIDE 5 MG/1
5 TABLET ORAL 4 TIMES DAILY
COMMUNITY
End: 2017-06-05

## 2017-05-11 RX ORDER — OXYCODONE HYDROCHLORIDE AND ACETAMINOPHEN 5; 325 MG/1; MG/1
1-2 TABLET ORAL EVERY 4 HOURS PRN
COMMUNITY

## 2017-05-11 RX ORDER — ALPRAZOLAM 0.5 MG/1
0.5 TABLET ORAL NIGHTLY PRN
COMMUNITY

## 2017-05-11 RX ORDER — POTASSIUM CHLORIDE 20 MEQ/1
80 TABLET, EXTENDED RELEASE ORAL ONCE
Status: COMPLETED | OUTPATIENT
Start: 2017-05-11 | End: 2017-05-11

## 2017-05-11 RX ORDER — CLOPIDOGREL BISULFATE 75 MG/1
75 TABLET ORAL DAILY
Status: ON HOLD | COMMUNITY
End: 2017-06-08

## 2017-05-11 RX ADMIN — INSULIN LISPRO 2 UNITS: 100 INJECTION, SOLUTION INTRAVENOUS; SUBCUTANEOUS at 18:13

## 2017-05-11 RX ADMIN — ASPIRIN 325 MG: 325 TABLET, COATED ORAL at 09:36

## 2017-05-11 RX ADMIN — INSULIN LISPRO 2 UNITS: 100 INJECTION, SOLUTION INTRAVENOUS; SUBCUTANEOUS at 13:01

## 2017-05-11 RX ADMIN — HEPARIN SODIUM 5000 UNITS: 5000 INJECTION, SOLUTION INTRAVENOUS; SUBCUTANEOUS at 06:08

## 2017-05-11 RX ADMIN — POTASSIUM CHLORIDE 80 MEQ: 1500 TABLET, EXTENDED RELEASE ORAL at 10:41

## 2017-05-11 ASSESSMENT — PAIN SCALES - GENERAL
PAINLEVEL_OUTOF10: 0
PAINLEVEL_OUTOF10: 0

## 2017-05-11 NOTE — DISCHARGE INSTRUCTIONS
Discharge Instructions    Discharged to home by car with relative. Discharged via wheelchair, hospital escort: Yes.  Special equipment needed: Not Applicable    Be sure to schedule a follow-up appointment with your primary care doctor or any specialists as instructed.     Discharge Plan:   Diet Plan: Discussed  Activity Level: Discussed  Confirmed Follow up Appointment: Appointment Scheduled  Confirmed Symptoms Management: Discussed  Medication Reconciliation Updated: Yes  Influenza Vaccine Indication: Patient Refuses    I understand that a diet low in cholesterol, fat, and sodium is recommended for good health. Unless I have been given specific instructions below for another diet, I accept this instruction as my diet prescription.   Other diet: Cardiac    Special Instructions: None    · Is patient discharged on Warfarin / Coumadin?   No     · Is patient Post Blood Transfusion?  No    Chest Pain, Nonspecific  It is often hard to give a specific diagnosis for the cause of chest pain. There is always a chance that your pain could be related to something serious, like a heart attack or a blood clot in the lungs. You need to follow up with your caregiver for further evaluation. More lab tests or other studies such as X-rays, electrocardiography, stress testing, or cardiac imaging may be needed to find the cause of your pain.  Most of the time, nonspecific chest pain improves within 2 to 3 days with rest and mild pain medicine. For the next few days, avoid physical exertion or activities that bring on pain. Do not smoke. Avoid drinking alcohol. Call your caregiver for routine follow-up as advised.   SEEK IMMEDIATE MEDICAL CARE IF:  · You develop increased chest pain or pain that radiates to the arm, neck, jaw, back, or abdomen.   · You develop shortness of breath, increased coughing, or you start coughing up blood.   · You have severe back or abdominal pain, nausea, or vomiting.   · You develop severe weakness, fainting,  fever, or chills.   Document Released: 12/18/2006 Document Revised: 03/11/2013 Document Reviewed: 06/06/2008  ExitCare® Patient Information ©2013 [employer].    Depression / Suicide Risk    As you are discharged from this [facility] facility, it is important to learn how to keep safe from harming yourself.    Recognize the warning signs:  · Abrupt changes in personality, positive or negative- including increase in energy   · Giving away possessions  · Change in eating patterns- significant weight changes-  positive or negative  · Change in sleeping patterns- unable to sleep or sleeping all the time   · Unwillingness or inability to communicate  · Depression  · Unusual sadness, discouragement and loneliness  · Talk of wanting to die  · Neglect of personal appearance   · Rebelliousness- reckless behavior  · Withdrawal from people/activities they love  · Confusion- inability to concentrate     If you or a loved one observes any of these behaviors or has concerns about self-harm, here's what you can do:  · Talk about it- your feelings and reasons for harming yourself  · Remove any means that you might use to hurt yourself (examples: pills, rope, extension cords, firearm)  · Get professional help from the community (Mental Health, Substance Abuse, psychological counseling)  · Do not be alone:Call your Safe Contact- someone whom you trust who will be there for you.  · Call your local CRISIS HOTLINE 546-1339 or 270-614-6828  · Call your local Children's Mobile Crisis Response Team [place] (740) 942-9706 or www.StageBloc  · Call the toll free National Suicide Prevention Hotlines   · National Suicide Prevention Lifeline 890-751-AIDK (8620)  · National Hope Line Network 800-SUICIDE (969-3829)

## 2017-05-11 NOTE — CARE PLAN
Problem: Nutritional:  Goal: Patient to verbalize or demonstrate understanding of diet  Outcome: MET Date Met:  05/11/17

## 2017-05-11 NOTE — DISCHARGE SUMMARY
HOSPITAL MEDICINE DISCHARGE SUMMARY    Name: Nohemy Baumann  MRN: [id number]  : 1965  Admit Date: 2017  Discharge Date: 2017  Attending Provider: Brooks Rodriguez M.D.  Primary Care Physician: Dora Varela M.D.    DISCHARGE DIAGNOSES:   Active Problems:    CKD (chronic kidney disease), stage V, with renal recovery (CMS-HCC) POA: Yes    Type 2 diabetes mellitus with nephropathy (CMS-HCC) POA: Yes    Hypokalemia POA: Yes    Smalll apical myocardial infarct, old POA: Yes  Resolved Problems:    Atypical chest pain POA: Yes      SUMMARY OF LEADING TO ADMISSION:  This is a 52-year-old female with history of   CKD stage V/end-stage renal disease on hemodialysis, type 2 diabetes    mellitus, hypertension, and hyperlipidemia, who presented to the ED with chest   pain.     For further details of history of present illness, past medical, social,    family histories, allergies and medications, please refer to admission H and P   by Dr. Toy Anton on 5/10/2017.     HOSPITAL COURSE:  The patient was admitted to the hospitalist service after    being initially evaluated in the emergency department.  Her initial blood    workup was remarkable for creatinine of 1.19, with potassium of 2.5, and    sodium of 134.  Her potassium deficit was replaced.  Chest x-ray showed some    cardiomegaly without any acute intrathoracic pathology.  EKG showed normal    sinus rhythm, with right bundle-branch block.  Her first troponin was    negative.  Given her risk factors, she was admitted for cardiac rule out.     She was monitored on telemetry, and she did not have any arrhythmias on    cardiac monitoring.  Her follow up troponins remained negative.  She    subsequently underwent nuclear stress testing, which showed no    inducible/reversible ischemia, with note of small inferolateral apical    myocardial infarct, with preserved left ventricular function.  She was    continued on aspirin.  She was monitored  overnight, and she did not have any    further recurrence of the chest pain.     She was evaluated by [facility], who opined that she may have    had renal recovery and gave their recommendation that she does not need    further hemodialysis.  Her potassium deficits were further replaced.  With my    discussion with Dr. Abhinav Govea of nephrology, he does not want her to be on   potassium supplement, and will set her up for a followup appointment at the    [facility] office, and there we will have repeat lab and start her on    potassium replacement if needed.     She remained hemodynamically stable and afebrile.  As mentioned, she did not    have any further recurrence of the chest pain.  With her clinical improvement,   she was deemed ready to be discharged from the hospital as she did not have    any further inpatient needs.  Patient felt comfortable going home.     The discharge plan was discussed with the patient with which she was agreeable   to. The patient was subsequently sent home in improved and stable condition.    FOLLOW-UP ISSUES:   1. CKD Stage 4 - She will follow-up with [facility].   2. Type 2 Diabetes mellitus - resume home dose novolog sliding scale, and levemir.   3. ?old small apical infarct - no reversible ischemia on nuclear stress test. Will continue her on baby aspirin. Follow-up with PCP in 1-2 weeks.   4. Hypokalemia - she will follow-up with nephrology who will get a repeat lab, and start her on replacement if needed.     CHANGES IN MANAGEMENT OF CHRONIC CONDITION: As above.     PENDING TESTS: none    FOLLOW-UP TESTS ORDERED POST DISCHARGE: none    DISCHARGE MEDICATIONS:   Medication Sig   aspirin 81 MG tablet Take 4 Tabs by mouth every day.   insulin detemir (LEVEMIR FLEXPEN) 100 UNIT/ML Solution Pen-injector injection Inject 20 Units as instructed 2 times a day.   NOVOLOG, insulin aspart, (NOVOLOG FLEXPEN) 100 UNIT/ML Solution Pen-injector injection Use  TID AC per sliding scale.   70   - 150  mg/dL =      0 Units   151 - 200  mg/dL =    2 Units   201 - 250  mg/dL =    3 Units   251 - 300  mg/dL  =   5 Units   301 - 350  mg/dL  =   6 Units   351 - 400 mg/dL   =   8 Units   Over 400 mg/dL   =   9 Units          FOLLOW-UP APPOINTMENTS:   1. PCP in 1-2 weeks.   2. [facility] in 1-2 weeks.       For further details on discharge medications, patient education, diet, and activity, please refer to electronic copy of discharge instructions.       TIME SPENT: 43 minutes, with greater than 50% of the time spent on face-to-face encounter, addressing medical issues, coordination of care, counseling, discharge planning, medication reconciliation, and documentation.

## 2017-05-11 NOTE — PROGRESS NOTES
Assumed care of patient. assessment complete. VSS. Denies pain at this time. Educated to use call light for assistance. Fall precautions in place. Will continue to monitor with hourly rounding.

## 2017-05-11 NOTE — CARE PLAN
Problem: Safety  Goal: Will remain free from injury  Outcome: PROGRESSING AS EXPECTED  Patient educated to use call light for assistance. Fall precautions in place. Staff will assist with mobility.     Problem: Infection  Goal: Will remain free from infection  Outcome: PROGRESSING AS EXPECTED  Proper hand hygiene before and after patient care to ensure patient  Will remain free from infection.

## 2017-05-11 NOTE — CARE PLAN
Problem: Nutritional:  Goal: Achieve adequate nutritional intake  Patient will consume 50% of meals   Outcome: MET Date Met:  05/11/17

## 2017-05-11 NOTE — PROGRESS NOTES
Pt stated she was feeling shakey, and would like RN to check blood sugar. At this time FSBS 101. Patient drank a cranberry juice and is feeling better. Will continue to monitor with hourly rounding.

## 2017-05-11 NOTE — PROGRESS NOTES
Bedside report completed.  Assumed pt care. Patient sitting up in bed, eating dinner, in no apparent distress.  Safety precautions in place. Call light & personal belongings within reach.  Plan of care discussed.  Patient is on 3L, IV is saline locked.  No reports of pain or needs expressed at his time.  Per patient she is currently not nauseous, but states that she has been quite frequently today.  Will continue to monitor with hourly rounding in place.

## 2017-05-11 NOTE — CARE PLAN
Problem: Communication  Goal: The ability to communicate needs accurately and effectively will improve  Outcome: PROGRESSING AS EXPECTED  Intervention: [place] patient and significant other/support system to call light to alert staff of needs  Patient oriented to surroundings.  Educated and understands the use of the call button for assistance.      Problem: Safety  Goal: Will remain free from falls  Outcome: PROGRESSING AS EXPECTED  Intervention: Implement fall precautions  Bed alarm is on, socks on patient, understands use of call button for assistance.

## 2017-05-12 ENCOUNTER — PATIENT OUTREACH (OUTPATIENT)
Dept: HEALTH INFORMATION MANAGEMENT | Facility: OTHER | Age: 52
End: 2017-05-12

## 2017-05-12 NOTE — CONSULTS
"DATE OF SERVICE:  05/11/2017    NEPHROLOGY CONSULTATION    YOB: 1965    REASON FOR CONSULTATION:  Acute CKD.    CHIEF COMPLAINT:  \"I am having chest pain.\"    HISTORY OF PRESENT ILLNESS:  This is a 52-year-old white female with PMH CKD,   hypertension, type 2 diabetes mellitus, hyperlipidemia and recent severe MOSHE   that made her dialysis dependent temporarily who presented to the ED with a   complaint of chest pain.  She states that it started in the morning on   05/10/2017 and was mostly sharp in nature and on the left side with no   radiation.  She described the pain intensity of 7/10 and she also had some   lightheadedness associated with it.  She denied any other symptoms such as   diaphoresis, shortness of breath, nausea, vomiting, fevers, chills or   abdominal pain with this.  She has never had any similar symptoms like this in   the past.  She was recently taken off dialysis and was being monitored with   weekly labs at her outpatient dialysis unit for possible renal recovery and   her last known serum creatinine was 2.9.  On presentation here in the ED, her   serum creatinine was actually improved to 1.2; however, she was hypokalemic   with a potassium of 2.5.  She was admitted to the hospital for further   evaluation for her chest pain as well as to help replete her electrolytes.    REVIEW OF SYSTEMS:  As per HPI, otherwise negative per AMA/CMS criteria.    PAST MEDICAL HISTORY:  1.  CKD stage III.  2.  Hypertension.  3.  Type 2 diabetes mellitus.  4.  Hyperlipidemia.  5.  CAD.  6.  Left toe osteomyelitis.  7.  Chronic diastolic heart failure.  8.  Deep venous thrombosis.  9.  Bilateral breast abscess.  10.  Bilateral breast nodules.  11.  Hypovitaminosis D.  12.  Pneumonia.  13.  Secondary hyperparathyroidism.    PAST SURGICAL HISTORY:  1.  Coronary stent.  2.  Cholecystectomy.  3.  Resection of pituitary tumor.  4.  Tonsillectomy.  5.  Bilateral breast abscesses I and D x5.  6.  " Temporary hemodialysis catheter.  7.  Right IJ PermCath.  8.  Left toe amputation for osteomyelitis.    FAMILY HISTORY:  1.  Alcoholism.  2.  Diabetes and CKD.  3.  Some distant relatives with ESRD on dialysis.    SOCIAL HISTORY:  1.  She is .  She moved to [place] from [place] in 2016 and has one   daughter who she lives with.  2.  She smokes less than one-half pack per day since the age of 14 and stopped   in .  3.  She denies ETOH use.  4.  She denies illicit drug use.    HOME MEDICATIONS:  Reviewed and documented in the chart.    ALLERGIES:  1.  NITROGLYCERIN REACTION HIVES.  2.  VANCOMYCIN REACTION HIVES.    PHYSICAL EXAMINATION:  VITAL SIGNS:  /73, pulse of 97, respiratory rate 16, temperature 97.1,   pulse ox 98% on 3 liters O2 nasal cannula.  Weight 82.4 kilograms, height 160   cm, BMI of 31.  GENERAL:  Well-developed, well-nourished white female in no apparent distress.  HEENT:  Normocephalic, atraumatic.  OP clear.  No scleral icterus.  Moist   mucous membranes.  NECK:  Supple, nontender.  CARDIOVASCULAR:  Regular rate and rhythm.  No murmurs, rubs or gallops.  LUNGS:  Clear to auscultation bilaterally.  No wheezes or rales.  ABDOMEN:  Soft, nontender, nondistended.  Positive bowel sounds.  EXTREMITIES:  No clubbing or cyanosis.  Trace pitting edema bilateral lower   extremities.  NEUROLOGIC:  No focal deficits.  Alert and oriented.  PSYCHIATRIC:  The patient has appropriate mood and affect.    DIAGNOSTIC LABORATORY DATA:  WBC 6.3, hemoglobin 10.3, hematocrit 31.3,   platelets 275, neutrophils 64.2%, lymphocytes 24.4%.  Sodium 134, potassium   2.5, chloride 96, CO2 30, BUN 13, creatinine 1.2, glucose 199, calcium 8.8.    Troponin I less than 0.01 and then second set at 0.01.  BNP 66.    DIAGNOSTIC IMAGIN.  Nuclear stress test showed no significant ST segment changes with   occasional PVCs and no chest pain during the test.  2.  TTE showed normal EF, grade I diastolic dysfunction,  and mild mitral   regurg.    IMPRESSION:  1.  Chronic kidney disease, stage III, etiology likely secondary to   hypertension/diabetes mellitus.  2.  Chest pain.  3.  Acute kidney injury, requiring RRT.  4.  Hypokalemia.  5.  Hypertension.  6.  Type 2 diabetes mellitus.  7.  Coronary artery disease.  8.  Hyperlipidemia.  9.  Obesity.    ASSESSMENT:  1.  GFR.  Baseline creatinine around 1.1 seems to have recovered to her   baseline, maybe with a little bit of progression from her recent devastating   acute kidney injury episode.  2.  Urine, nonoliguric.  She admits to making more and more urine everyday.  3.  BP goal less than 140/90.  4.  Acid base.  No significant acid base disturbance noted.  5.  Electrolytes; hypokalemia, asymptomatic, but could have been the reason   for her cardiac issues from severe hypokalemia.  6.  Anemia.  Goal hemoglobin 10-11.  7.  MBD.  Calcium normal, PO4 unavailable.  8.  Dialysis access right IJ PermCath.    PLAN:  1.  No compelling indication for RRT at this time.  2.  Advised her that she likely has recovered sufficient renal function to   come off the dialysis.  However, she probably has progressed to chronic kidney   disease stage III due to the recent MOSHE.  3.  We will give her some orange juice today.  4.  We will give her 80 mEq of KCl p.o. x1 today.  5.  Discontinue renal diet and changed to ADA.  6.  Advised her we will check her labs next week with a followup in our   outpatient clinic for an appointment the week after.  7.  Dose all medications for EGFR less than 60.  8.  We will follow the patient closely with you.  9.  If okay from other medical issues, she is stable to be discharged from a   renal standpoint as she will have appropriate followup as an outpatient.    Thank you for this consultation.       ____________________________________     MD ALY Rosales / GODFREY    DD:  05/11/2017 14:48:34  DT:  05/11/2017 19:14:07    D#:  [id number]  Job#:  [id number]

## 2017-06-04 NOTE — ADDENDUM NOTE
Encounter addended by: Dianne Marroquin R.N. on: 6/4/2017 12:10 PM<BR>     Documentation filed: Inpatient Document Flowsheet

## 2017-06-05 ENCOUNTER — HOSPITAL ENCOUNTER (INPATIENT)
Facility: MEDICAL CENTER | Age: 52
LOS: 1 days | DRG: 699 | End: 2017-06-08
Attending: EMERGENCY MEDICINE | Admitting: HOSPITALIST
Payer: MEDICARE

## 2017-06-05 ENCOUNTER — RESOLUTE PROFESSIONAL BILLING HOSPITAL PROF FEE (OUTPATIENT)
Dept: HOSPITALIST | Facility: MEDICAL CENTER | Age: 52
End: 2017-06-05
Payer: MEDICARE

## 2017-06-05 DIAGNOSIS — N17.9 ACUTE ON CHRONIC RENAL FAILURE (HCC): ICD-10-CM

## 2017-06-05 DIAGNOSIS — E86.0 DEHYDRATION: ICD-10-CM

## 2017-06-05 DIAGNOSIS — N18.9 ACUTE ON CHRONIC RENAL FAILURE (HCC): ICD-10-CM

## 2017-06-05 DIAGNOSIS — R11.2 INTRACTABLE VOMITING WITH NAUSEA, UNSPECIFIED VOMITING TYPE: ICD-10-CM

## 2017-06-05 LAB
ALBUMIN SERPL BCP-MCNC: 2.7 G/DL (ref 3.2–4.9)
ALBUMIN/GLOB SERPL: 0.6 G/DL
ALP SERPL-CCNC: 90 U/L (ref 30–99)
ALT SERPL-CCNC: 9 U/L (ref 2–50)
ANION GAP SERPL CALC-SCNC: 8 MMOL/L (ref 0–11.9)
APPEARANCE UR: CLEAR
AST SERPL-CCNC: 10 U/L (ref 12–45)
BACTERIA #/AREA URNS HPF: ABNORMAL /HPF
BASOPHILS # BLD AUTO: 0.5 % (ref 0–1.8)
BASOPHILS # BLD: 0.05 K/UL (ref 0–0.12)
BILIRUB SERPL-MCNC: 0.3 MG/DL (ref 0.1–1.5)
BILIRUB UR QL STRIP.AUTO: NEGATIVE
BUN SERPL-MCNC: 29 MG/DL (ref 8–22)
CALCIUM SERPL-MCNC: 8.6 MG/DL (ref 8.5–10.5)
CHLORIDE SERPL-SCNC: 108 MMOL/L (ref 96–112)
CO2 SERPL-SCNC: 25 MMOL/L (ref 20–33)
COLOR UR: ABNORMAL
CREAT SERPL-MCNC: 1.68 MG/DL (ref 0.5–1.4)
CULTURE IF INDICATED INDCX: YES UA CULTURE
EOSINOPHIL # BLD AUTO: 0.16 K/UL (ref 0–0.51)
EOSINOPHIL NFR BLD: 1.7 % (ref 0–6.9)
EPI CELLS #/AREA URNS HPF: ABNORMAL /HPF
ERYTHROCYTE [DISTWIDTH] IN BLOOD BY AUTOMATED COUNT: 46.8 FL (ref 35.9–50)
GFR SERPL CREATININE-BSD FRML MDRD: 32 ML/MIN/1.73 M 2
GLOBULIN SER CALC-MCNC: 4.2 G/DL (ref 1.9–3.5)
GLUCOSE BLD-MCNC: 145 MG/DL (ref 65–99)
GLUCOSE BLD-MCNC: 161 MG/DL (ref 65–99)
GLUCOSE SERPL-MCNC: 158 MG/DL (ref 65–99)
GLUCOSE UR STRIP.AUTO-MCNC: 500 MG/DL
HCT VFR BLD AUTO: 31.1 % (ref 37–47)
HGB BLD-MCNC: 10 G/DL (ref 12–16)
HYALINE CASTS #/AREA URNS LPF: >10 /LPF
IMM GRANULOCYTES # BLD AUTO: 0.02 K/UL (ref 0–0.11)
IMM GRANULOCYTES NFR BLD AUTO: 0.2 % (ref 0–0.9)
KETONES UR STRIP.AUTO-MCNC: NEGATIVE MG/DL
LEUKOCYTE ESTERASE UR QL STRIP.AUTO: NEGATIVE
LIPASE SERPL-CCNC: 25 U/L (ref 11–82)
LYMPHOCYTES # BLD AUTO: 1.43 K/UL (ref 1–4.8)
LYMPHOCYTES NFR BLD: 15.4 % (ref 22–41)
MCH RBC QN AUTO: 28.4 PG (ref 27–33)
MCHC RBC AUTO-ENTMCNC: 32.2 G/DL (ref 33.6–35)
MCV RBC AUTO: 88.4 FL (ref 81.4–97.8)
MICRO URNS: ABNORMAL
MONOCYTES # BLD AUTO: 0.45 K/UL (ref 0–0.85)
MONOCYTES NFR BLD AUTO: 4.9 % (ref 0–13.4)
MUCOUS THREADS #/AREA URNS HPF: ABNORMAL /HPF
NEUTROPHILS # BLD AUTO: 7.16 K/UL (ref 2–7.15)
NEUTROPHILS NFR BLD: 77.3 % (ref 44–72)
NITRITE UR QL STRIP.AUTO: NEGATIVE
NRBC # BLD AUTO: 0 K/UL
NRBC BLD AUTO-RTO: 0 /100 WBC
PH UR STRIP.AUTO: 7.5 [PH]
PLATELET # BLD AUTO: 309 K/UL (ref 164–446)
PMV BLD AUTO: 8.7 FL (ref 9–12.9)
POTASSIUM SERPL-SCNC: 3.6 MMOL/L (ref 3.6–5.5)
PROT SERPL-MCNC: 6.9 G/DL (ref 6–8.2)
PROT UR QL STRIP: 600 MG/DL
RBC # BLD AUTO: 3.52 M/UL (ref 4.2–5.4)
RBC # URNS HPF: ABNORMAL /HPF
RBC UR QL AUTO: ABNORMAL
SODIUM SERPL-SCNC: 141 MMOL/L (ref 135–145)
SP GR UR STRIP.AUTO: 1.01
TROPONIN I SERPL-MCNC: <0.01 NG/ML (ref 0–0.04)
WBC # BLD AUTO: 9.3 K/UL (ref 4.8–10.8)
WBC #/AREA URNS HPF: ABNORMAL /HPF

## 2017-06-05 PROCEDURE — 99220 PR INITIAL OBSERVATION CARE,LEVL III: CPT | Performed by: HOSPITALIST

## 2017-06-05 PROCEDURE — 700111 HCHG RX REV CODE 636 W/ 250 OVERRIDE (IP)

## 2017-06-05 PROCEDURE — G0378 HOSPITAL OBSERVATION PER HR: HCPCS

## 2017-06-05 PROCEDURE — 85025 COMPLETE CBC W/AUTO DIFF WBC: CPT

## 2017-06-05 PROCEDURE — 81001 URINALYSIS AUTO W/SCOPE: CPT

## 2017-06-05 PROCEDURE — 82728 ASSAY OF FERRITIN: CPT

## 2017-06-05 PROCEDURE — 80053 COMPREHEN METABOLIC PANEL: CPT

## 2017-06-05 PROCEDURE — 93005 ELECTROCARDIOGRAM TRACING: CPT | Performed by: EMERGENCY MEDICINE

## 2017-06-05 PROCEDURE — 84443 ASSAY THYROID STIM HORMONE: CPT

## 2017-06-05 PROCEDURE — 87077 CULTURE AEROBIC IDENTIFY: CPT

## 2017-06-05 PROCEDURE — 82962 GLUCOSE BLOOD TEST: CPT

## 2017-06-05 PROCEDURE — 84484 ASSAY OF TROPONIN QUANT: CPT

## 2017-06-05 PROCEDURE — 700101 HCHG RX REV CODE 250: Performed by: HOSPITALIST

## 2017-06-05 PROCEDURE — 99285 EMERGENCY DEPT VISIT HI MDM: CPT

## 2017-06-05 PROCEDURE — 700105 HCHG RX REV CODE 258: Performed by: EMERGENCY MEDICINE

## 2017-06-05 PROCEDURE — 700111 HCHG RX REV CODE 636 W/ 250 OVERRIDE (IP): Performed by: HOSPITALIST

## 2017-06-05 PROCEDURE — 96376 TX/PRO/DX INJ SAME DRUG ADON: CPT

## 2017-06-05 PROCEDURE — 96375 TX/PRO/DX INJ NEW DRUG ADDON: CPT

## 2017-06-05 PROCEDURE — 700102 HCHG RX REV CODE 250 W/ 637 OVERRIDE(OP): Performed by: HOSPITALIST

## 2017-06-05 PROCEDURE — 87186 SC STD MICRODIL/AGAR DIL: CPT

## 2017-06-05 PROCEDURE — 96374 THER/PROPH/DIAG INJ IV PUSH: CPT

## 2017-06-05 PROCEDURE — 94760 N-INVAS EAR/PLS OXIMETRY 1: CPT

## 2017-06-05 PROCEDURE — 96361 HYDRATE IV INFUSION ADD-ON: CPT

## 2017-06-05 PROCEDURE — 700105 HCHG RX REV CODE 258: Performed by: HOSPITALIST

## 2017-06-05 PROCEDURE — 83690 ASSAY OF LIPASE: CPT

## 2017-06-05 PROCEDURE — 700111 HCHG RX REV CODE 636 W/ 250 OVERRIDE (IP): Performed by: EMERGENCY MEDICINE

## 2017-06-05 PROCEDURE — 87086 URINE CULTURE/COLONY COUNT: CPT

## 2017-06-05 RX ORDER — POLYETHYLENE GLYCOL 3350 17 G/17G
1 POWDER, FOR SOLUTION ORAL
Status: DISCONTINUED | OUTPATIENT
Start: 2017-06-05 | End: 2017-06-08 | Stop reason: HOSPADM

## 2017-06-05 RX ORDER — PROMETHAZINE HYDROCHLORIDE 25 MG/1
12.5-25 TABLET ORAL EVERY 4 HOURS PRN
Status: DISCONTINUED | OUTPATIENT
Start: 2017-06-05 | End: 2017-06-08 | Stop reason: HOSPADM

## 2017-06-05 RX ORDER — LABETALOL HYDROCHLORIDE 5 MG/ML
20 INJECTION, SOLUTION INTRAVENOUS EVERY 4 HOURS PRN
Status: DISCONTINUED | OUTPATIENT
Start: 2017-06-05 | End: 2017-06-08 | Stop reason: HOSPADM

## 2017-06-05 RX ORDER — BISACODYL 10 MG
10 SUPPOSITORY, RECTAL RECTAL
Status: DISCONTINUED | OUTPATIENT
Start: 2017-06-05 | End: 2017-06-08 | Stop reason: HOSPADM

## 2017-06-05 RX ORDER — ONDANSETRON 2 MG/ML
4 INJECTION INTRAMUSCULAR; INTRAVENOUS EVERY 4 HOURS PRN
Status: DISCONTINUED | OUTPATIENT
Start: 2017-06-05 | End: 2017-06-08 | Stop reason: HOSPADM

## 2017-06-05 RX ORDER — AMOXICILLIN 250 MG
2 CAPSULE ORAL 2 TIMES DAILY
Status: DISCONTINUED | OUTPATIENT
Start: 2017-06-05 | End: 2017-06-08 | Stop reason: HOSPADM

## 2017-06-05 RX ORDER — PROMETHAZINE HYDROCHLORIDE 25 MG/1
12.5-25 SUPPOSITORY RECTAL EVERY 4 HOURS PRN
Status: DISCONTINUED | OUTPATIENT
Start: 2017-06-05 | End: 2017-06-08 | Stop reason: HOSPADM

## 2017-06-05 RX ORDER — ONDANSETRON 2 MG/ML
4 INJECTION INTRAMUSCULAR; INTRAVENOUS ONCE
Status: COMPLETED | OUTPATIENT
Start: 2017-06-05 | End: 2017-06-05

## 2017-06-05 RX ORDER — OXYCODONE HYDROCHLORIDE 5 MG/1
5 TABLET ORAL
Status: DISCONTINUED | OUTPATIENT
Start: 2017-06-05 | End: 2017-06-08 | Stop reason: HOSPADM

## 2017-06-05 RX ORDER — SODIUM CHLORIDE 9 MG/ML
INJECTION, SOLUTION INTRAVENOUS CONTINUOUS
Status: DISCONTINUED | OUTPATIENT
Start: 2017-06-05 | End: 2017-06-06

## 2017-06-05 RX ORDER — SODIUM CHLORIDE 9 MG/ML
1000 INJECTION, SOLUTION INTRAVENOUS ONCE
Status: COMPLETED | OUTPATIENT
Start: 2017-06-05 | End: 2017-06-05

## 2017-06-05 RX ORDER — HYDRALAZINE HYDROCHLORIDE 20 MG/ML
20 INJECTION INTRAMUSCULAR; INTRAVENOUS EVERY 6 HOURS PRN
Status: DISCONTINUED | OUTPATIENT
Start: 2017-06-05 | End: 2017-06-08 | Stop reason: HOSPADM

## 2017-06-05 RX ORDER — DEXTROSE MONOHYDRATE 25 G/50ML
25 INJECTION, SOLUTION INTRAVENOUS
Status: DISCONTINUED | OUTPATIENT
Start: 2017-06-05 | End: 2017-06-08 | Stop reason: HOSPADM

## 2017-06-05 RX ORDER — MORPHINE SULFATE 4 MG/ML
2 INJECTION, SOLUTION INTRAMUSCULAR; INTRAVENOUS
Status: DISCONTINUED | OUTPATIENT
Start: 2017-06-05 | End: 2017-06-08 | Stop reason: HOSPADM

## 2017-06-05 RX ORDER — AMLODIPINE BESYLATE 5 MG/1
5 TABLET ORAL DAILY
Status: DISCONTINUED | OUTPATIENT
Start: 2017-06-06 | End: 2017-06-06

## 2017-06-05 RX ORDER — OXYCODONE HYDROCHLORIDE 5 MG/1
2.5 TABLET ORAL
Status: DISCONTINUED | OUTPATIENT
Start: 2017-06-05 | End: 2017-06-08 | Stop reason: HOSPADM

## 2017-06-05 RX ORDER — ONDANSETRON 4 MG/1
4 TABLET, ORALLY DISINTEGRATING ORAL EVERY 4 HOURS PRN
Status: DISCONTINUED | OUTPATIENT
Start: 2017-06-05 | End: 2017-06-08 | Stop reason: HOSPADM

## 2017-06-05 RX ORDER — ACETAMINOPHEN 325 MG/1
650 TABLET ORAL EVERY 6 HOURS PRN
Status: DISCONTINUED | OUTPATIENT
Start: 2017-06-05 | End: 2017-06-08 | Stop reason: HOSPADM

## 2017-06-05 RX ORDER — CLOPIDOGREL BISULFATE 75 MG/1
75 TABLET ORAL DAILY
Status: DISCONTINUED | OUTPATIENT
Start: 2017-06-06 | End: 2017-06-08

## 2017-06-05 RX ORDER — ATORVASTATIN CALCIUM 40 MG/1
80 TABLET, FILM COATED ORAL NIGHTLY
Status: DISCONTINUED | OUTPATIENT
Start: 2017-06-05 | End: 2017-06-08 | Stop reason: HOSPADM

## 2017-06-05 RX ORDER — INSULIN GLARGINE 100 [IU]/ML
20 INJECTION, SOLUTION SUBCUTANEOUS 2 TIMES DAILY
Status: DISCONTINUED | OUTPATIENT
Start: 2017-06-05 | End: 2017-06-08 | Stop reason: HOSPADM

## 2017-06-05 RX ORDER — ALPRAZOLAM 0.5 MG/1
0.5 TABLET ORAL NIGHTLY PRN
Status: DISCONTINUED | OUTPATIENT
Start: 2017-06-05 | End: 2017-06-08 | Stop reason: HOSPADM

## 2017-06-05 RX ORDER — METOCLOPRAMIDE HYDROCHLORIDE 5 MG/ML
10 INJECTION INTRAMUSCULAR; INTRAVENOUS ONCE
Status: COMPLETED | OUTPATIENT
Start: 2017-06-05 | End: 2017-06-05

## 2017-06-05 RX ORDER — OXYCODONE HYDROCHLORIDE AND ACETAMINOPHEN 5; 325 MG/1; MG/1
1-2 TABLET ORAL EVERY 4 HOURS PRN
Status: DISCONTINUED | OUTPATIENT
Start: 2017-06-05 | End: 2017-06-08 | Stop reason: HOSPADM

## 2017-06-05 RX ORDER — OMEPRAZOLE 20 MG/1
20 CAPSULE, DELAYED RELEASE ORAL 2 TIMES DAILY
Status: DISCONTINUED | OUTPATIENT
Start: 2017-06-05 | End: 2017-06-08 | Stop reason: HOSPADM

## 2017-06-05 RX ADMIN — ONDANSETRON 4 MG: 2 INJECTION INTRAMUSCULAR; INTRAVENOUS at 13:17

## 2017-06-05 RX ADMIN — SODIUM CHLORIDE 1000 ML: 9 INJECTION, SOLUTION INTRAVENOUS at 12:53

## 2017-06-05 RX ADMIN — LABETALOL HYDROCHLORIDE 20 MG: 5 INJECTION, SOLUTION INTRAVENOUS at 18:11

## 2017-06-05 RX ADMIN — HYDROMORPHONE HYDROCHLORIDE 0.5 MG: 1 INJECTION, SOLUTION INTRAMUSCULAR; INTRAVENOUS; SUBCUTANEOUS at 16:24

## 2017-06-05 RX ADMIN — ONDANSETRON 4 MG: 2 INJECTION INTRAMUSCULAR; INTRAVENOUS at 11:28

## 2017-06-05 RX ADMIN — ONDANSETRON 4 MG: 2 INJECTION INTRAMUSCULAR; INTRAVENOUS at 22:14

## 2017-06-05 RX ADMIN — ONDANSETRON 4 MG: 2 INJECTION INTRAMUSCULAR; INTRAVENOUS at 18:11

## 2017-06-05 RX ADMIN — SODIUM CHLORIDE: 9 INJECTION, SOLUTION INTRAVENOUS at 17:43

## 2017-06-05 RX ADMIN — METOCLOPRAMIDE 10 MG: 5 INJECTION, SOLUTION INTRAMUSCULAR; INTRAVENOUS at 16:24

## 2017-06-05 ASSESSMENT — LIFESTYLE VARIABLES
EVER_SMOKED: YES
DO YOU DRINK ALCOHOL: NO

## 2017-06-05 ASSESSMENT — PAIN SCALES - GENERAL
PAINLEVEL_OUTOF10: 0
PAINLEVEL_OUTOF10: 0
PAINLEVEL_OUTOF10: 7

## 2017-06-05 NOTE — IP AVS SNAPSHOT
6/8/2017    Nohemy Baumann  [address] [place] [address]    Dear Nohemy:    [facility] wants to ensure your discharge home is safe and you or your loved ones have had all of your questions answered regarding your care after you leave the hospital.    Below is a list of resources and contact information should you have any questions regarding your hospital stay, follow-up instructions, or active medical symptoms.    Questions or Concerns Regarding… Contact   Medical Questions Related to Your Discharge  (7 days a week, 8am-5pm) Contact a Nurse Care Coordinator   [phone]   Medical Questions Not Related to Your Discharge  (24 hours a day / 7 days a week)  Contact the Nurse Health Line   [phone]    Medications or Discharge Instructions Refer to your discharge packet   or contact your [facility] Primary Care Provider   [phone]   Follow-up Appointment(s) Schedule your appointment via Zenter   or contact Scheduling [phone]   Billing Review your statement via Zenter  or contact Billing [phone]   Medical Records Review your records via Zenter   or contact Medical Records [phone]     You may receive a telephone call within two days of discharge. This call is to make certain you understand your discharge instructions and have the opportunity to have any questions answered. You can also easily access your medical information, test results and upcoming appointments via the Zenter free online health management tool. You can learn more and sign up at [email or web address]. For assistance setting up your Zenter account, please call [phone].    Once again, we want to ensure your discharge home is safe and that you have a clear understanding of any next steps in your care. If you have any questions or concerns, please do not hesitate to contact us, we are here for you. Thank you for choosing [facility] for your healthcare needs.    Sincerely,    Your [facility] Healthcare Team

## 2017-06-05 NOTE — ED NOTES
Chief Complaint   Patient presents with   • N/V     states onset last wednesday       Pt states used to be on dialysis but was instructed to stop by her MD due to normal lab values.  States constipated for few days

## 2017-06-05 NOTE — ED PROVIDER NOTES
ED Provider Note    Scribed for Donavan Ortiz M.D. by Neil Vásquez. 6/5/2017  11:43 AM    Primary care provider: Dora Varela M.D.  Means of arrival: walk in  History obtained from: patient  History limited by: none    CHIEF COMPLAINT  Chief Complaint   Patient presents with   • N/V     states onset last wednesday       JORGE Baumann is a 52 y.o. female who presents to the Emergency Department with nausea and vomiting starting 5 days ago. She reports associated epigastric and periumbilical abdominal pain that she describes as constant and burning, that does not radiate. Patient has a history of diabetes and renal insufficiency for which she used to go to dialysis. She reports that she discontinued dialysis treatment 1 month ago due to normal laboratory values. Patient denies diarrhea.     REVIEW OF SYSTEMS  Pertinent positives include nausea, vomiting, abdominal pain.   Pertinent negatives include no dairrhea.    All other systems reviewed and negative.    C.    PAST MEDICAL HISTORY   History of diabetes    SURGICAL HISTORY  patient denies any surgical history    SOCIAL HISTORY  Social History   Substance Use Topics   • Smoking status: No   • Smokeless tobacco: No   • Alcohol Use: No      History   Drug Use No       FAMILY HISTORY  None noted    CURRENT MEDICATIONS  No current facility-administered medications for this encounter.    Current outpatient prescriptions:   •  aspirin 81 MG tablet, Take 4 Tabs by mouth every day., Disp: 30 Tab, Rfl: 1  •  insulin detemir (LEVEMIR FLEXPEN) 100 UNIT/ML Solution Pen-injector injection, Inject 20 Units as instructed 2 times a day., Disp: , Rfl:   •  NOVOLOG, insulin aspart, (NOVOLOG FLEXPEN) 100 UNIT/ML Solution Pen-injector injection, Use TID AC per sliding scale.  70   - 150  mg/dL =      0 Units  151 - 200  mg/dL =    2 Units  201 - 250  mg/dL =    3 Units  251 - 300  mg/dL  =   5 Units  301 - 350  mg/dL  =   6 Units  351 - 400 mg/dL   =   8 Units  Over 400  "mg/dL   =   9 Units, Disp: 1 PEN, Rfl: 2  •  vitamin D (CHOLECALCIFEROL) 1000 UNIT Tab, Take 1,000 Units by mouth every day., Disp: , Rfl:   •  amlodipine (NORVASC) 5 MG Tab, Take 5 mg by mouth every day., Disp: , Rfl:   •  metoprolol (LOPRESSOR) 25 MG Tab, Take 25 mg by mouth 2 times a day., Disp: , Rfl:   •  alprazolam (XANAX) 0.5 MG Tab, Take 0.5 mg by mouth at bedtime as needed for Sleep., Disp: , Rfl:   •  oxycodone-acetaminophen (PERCOCET) 5-325 MG Tab, Take 1-2 Tabs by mouth every four hours as needed., Disp: , Rfl:   •  metoclopramide (REGLAN) 5 MG tablet, Take 5 mg by mouth 4 times a day., Disp: , Rfl:   •  furosemide (LASIX) 20 MG Tab, Take 20 mg by mouth every day., Disp: , Rfl:   •  vitamin D, Ergocalciferol, (DRISDOL) 45433 UNITS Cap capsule, Take 50,000 Units by mouth every 7 days., Disp: , Rfl:   •  atorvastatin (LIPITOR) 80 MG tablet, Take 80 mg by mouth every evening., Disp: , Rfl:   •  clopidogrel (PLAVIX) 75 MG Tab, Take 75 mg by mouth every day., Disp: , Rfl:   •  apixaban (ELIQUIS) 5mg Tab, Take 5 mg by mouth 2 Times a Day., Disp: , Rfl:     ALLERGIES  Allergies   Allergen Reactions   • Nitroglycerin Hives   • Vancomycin Hives       PHYSICAL EXAM  VITAL SIGNS: /95 mmHg  Pulse 96  Temp(Src) 36.4 °C (97.5 °F)  Resp 16  Ht 1.6 m (5' 2.99\")  Wt 78.3 kg (172 lb 9.9 oz)  BMI 30.59 kg/m2  SpO2 96%    Nursing note and vitals reviewed.  Constitutional: Well-developed and well-nourished. No distress. Elderly, chronically ill appearing.  HENT: Head is normocephalic and atraumatic. Oropharynx is clear and dry without exudate or erythema. Mucous membranes are dry.   Eyes: Pupils are equal, round, and reactive to light. Conjunctiva are normal.   Cardiovascular: Normal rate and regular rhythm. No murmur heard. Normal radial pulses.  Pulmonary/Chest: Breath sounds normal. No wheezes or rales. 5x5 cm ecchymosis in epigastrium. Permacath in right upper chest wall. Surrounding skin is " benign.  Abdominal: Soft and non-tender. No distention    Musculoskeletal: Extremities exhibit normal range of motion without edema or tenderness.   Neurological: Awake, alert and oriented to person, place, and time. No focal deficits noted.  Skin: Skin is warm and dry. No rash.   Psychiatric: Normal mood and affect. Appropriate for clinical situation    DIAGNOSTIC STUDIES / PROCEDURES    EKG Interpretation  See Labs below for interpretation.     LABS  Results for orders placed or performed during the hospital encounter of 06/05/17   CBC WITH DIFFERENTIAL   Result Value Ref Range    WBC 9.3 4.8 - 10.8 K/uL    RBC 3.52 (L) 4.20 - 5.40 M/uL    Hemoglobin 10.0 (L) 12.0 - 16.0 g/dL    Hematocrit 31.1 (L) 37.0 - 47.0 %    MCV 88.4 81.4 - 97.8 fL    MCH 28.4 27.0 - 33.0 pg    MCHC 32.2 (L) 33.6 - 35.0 g/dL    RDW 46.8 35.9 - 50.0 fL    Platelet Count 309 164 - 446 K/uL    MPV 8.7 (L) 9.0 - 12.9 fL    Neutrophils-Polys 77.30 (H) 44.00 - 72.00 %    Lymphocytes 15.40 (L) 22.00 - 41.00 %    Monocytes 4.90 0.00 - 13.40 %    Eosinophils 1.70 0.00 - 6.90 %    Basophils 0.50 0.00 - 1.80 %    Immature Granulocytes 0.20 0.00 - 0.90 %    Nucleated RBC 0.00 /100 WBC    Neutrophils (Absolute) 7.16 (H) 2.00 - 7.15 K/uL    Lymphs (Absolute) 1.43 1.00 - 4.80 K/uL    Monos (Absolute) 0.45 0.00 - 0.85 K/uL    Eos (Absolute) 0.16 0.00 - 0.51 K/uL    Baso (Absolute) 0.05 0.00 - 0.12 K/uL    Immature Granulocytes (abs) 0.02 0.00 - 0.11 K/uL    NRBC (Absolute) 0.00 K/uL   COMP METABOLIC PANEL   Result Value Ref Range    Sodium 141 135 - 145 mmol/L    Potassium 3.6 3.6 - 5.5 mmol/L    Chloride 108 96 - 112 mmol/L    Co2 25 20 - 33 mmol/L    Anion Gap 8.0 0.0 - 11.9    Glucose 158 (H) 65 - 99 mg/dL    Bun 29 (H) 8 - 22 mg/dL    Creatinine 1.68 (H) 0.50 - 1.40 mg/dL    Calcium 8.6 8.5 - 10.5 mg/dL    AST(SGOT) 10 (L) 12 - 45 U/L    ALT(SGPT) 9 2 - 50 U/L    Alkaline Phosphatase 90 30 - 99 U/L    Total Bilirubin 0.3 0.1 - 1.5 mg/dL    Albumin 2.7  (L) 3.2 - 4.9 g/dL    Total Protein 6.9 6.0 - 8.2 g/dL    Globulin 4.2 (H) 1.9 - 3.5 g/dL    A-G Ratio 0.6 g/dL   LIPASE   Result Value Ref Range    Lipase 25 11 - 82 U/L   TROPONIN   Result Value Ref Range    Troponin I <0.01 0.00 - 0.04 ng/mL   URINALYSIS CULTURE, IF INDICATED   Result Value Ref Range    Micro Urine Req Microscopic     Color Lt. Yellow     Character Clear     Specific Gravity 1.014 <1.035    Ph 7.5 5.0-8.0    Glucose 500 (A) Negative mg/dL    Ketones Negative Negative mg/dL    Protein 600 (A) Negative mg/dL    Bilirubin Negative Negative    Nitrite Negative Negative    Leukocyte Esterase Negative Negative    Occult Blood Small (A) Negative    Culture Indicated Yes UA Culture   ESTIMATED GFR   Result Value Ref Range    GFR If  39 (A) >60 mL/min/1.73 m 2    GFR If Non  32 (A) >60 mL/min/1.73 m 2   URINE MICROSCOPIC (W/UA)   Result Value Ref Range    WBC 2-5 /hpf    RBC 10-20 (A) /hpf    Bacteria Moderate (A) None /hpf    Epithelial Cells Few /hpf    Mucous Threads Few /hpf    Hyaline Cast >10 (A) /lpf   EKG (NOW)   Result Value Ref Range    Report       [facility] Emergency Dept.    Test Date:  2017  Pt Name:    ELIZA MORIN                 Department: ER  MRN:        [id number]                      Room:       [facility]  Gender:     F                            Technician: RN  :        [id number]                   Requested By:ISHA DEAL  Order #:    [id number]                    Reading MD:    Measurements  Intervals                                Axis  Rate:       98                           P:          73  MT:         168                          QRS:        -86  QRSD:       142                          T:          -19  QT:         400  QTc:        511    Interpretive Statements  SINUS RHYTHM  RIGHT BUNDLE BRANCH BLOCK  BASELINE WANDER IN LEAD(S) II,III,aVF,V1,V2  Compared to ECG 2017 21:58:09  First degree AV block no longer  present     All labs reviewed by me.    COURSE & MEDICAL DECISION MAKING  Nursing notes, VS, PMSFHx reviewed in chart.     11:43 AM - Patient seen and examined at bedside. Patient will be treated with Dilaudid 0.5 mg, Reglan 10 mg, Zofran 4 mg, NS 1000 ml for dehydration. Ordered CBC with differential, CMP, Lipase, Troponin, Urinalysis, Estimated GFR to evaluate her symptoms. The differential diagnoses include but are not limited to: viral syndrome, pancreatitis, gastritis, electrolyte abnormalities.      4:37 PM - I discussed the patient's case and the above findings with Dr. Benavidez (hospitalist) who agrees to admit the patient.     The patient presents today with nausea and vomiting. She has a history of renal failure requiring dialysis. However her renal function has normalized and she recently stopped dialysis. She's been vomiting for last 24 hours. She appears dehydrated at this time. She is fluid resuscitated with normal saline in the emergency department. Laboratory studies are remarkable for an increase in her creatinine. She is treated with anti-emetics in the emergency department. Despite this she continues to vomit. I feel that she would benefit from admission the hospital for IV hydration and further treatment. I spoke with the on-call hospice for admission.    DISPOSITION:  Patient will be admitted to hospitalist in guarded condition.      FINAL IMPRESSION  1. Intractable vomiting with nausea, unspecified vomiting type    2. Acute on chronic renal failure (CMS-HCC)          Neil SCOTT (Caroleibbandar), am scribing for, and in the presence of, Donavan Ortiz M.D..    Electronically signed by: Neil Vásquez (Christoph), 6/5/2017    Donavan SCOTT M.D. personally performed the services described in this documentation, as scribed by Neil Vásquez in my presence, and it is both accurate and complete.    The note accurately reflects work and decisions made by me.  Donavan Ortiz  6/5/2017  5:13  PM

## 2017-06-05 NOTE — IP AVS SNAPSHOT
" <p align=\"LEFT\"><IMG SRC=\"//[email or web address]$/[email or web address]\" alt=\"Image\" WIDTH=\"50%\" HEIGHT=\"200\" BORDER=\"\"></p>                   Name:Nohemy Baumann  Medical Record Number:[id number]  CSN: [id number]    YOB: 1965   Age: 52 y.o.  Sex: female  HT:1.6 m (5' 2.99\") WT: 79.7 kg (175 lb 11.3 oz)          Admit Date: 6/5/2017     Discharge Date:   Today's Date: 6/8/2017  Attending Doctor:  Dieter López M.D.                  Allergies:  Nitroglycerin and Vancomycin          Follow-up Information     1. Follow up with Mohsen Tamasaby, M.D. In 1 week.    Specialty:  Family Medicine    Why:  Admission for UTI and Nausea and Vomiting    Contact information    [address] [address]  [phone]          2. Follow up with [facility], Emergency Dept.    Specialty:  Emergency Medicine    Why:  As needed, If symptoms worsen    Contact information    [address] [facility] [address] [phone]         Medication List      Take these Medications        Instructions    alprazolam 0.5 MG Tabs   Commonly known as:  XANAX    Take 0.5 mg by mouth at bedtime as needed for Sleep.   Dose:  0.5 mg       amlodipine 5 MG Tabs   Commonly known as:  NORVASC    Take 5 mg by mouth every day.   Dose:  5 mg       ampicillin 500 MG Caps   Commonly known as:  PRINCIPEN    Take 1 Cap by mouth every 6 hours for 3 days.   Dose:  500 mg       aspirin 81 MG tablet    Take 81 mg by mouth every day.   Dose:  81 mg       atorvastatin 80 MG tablet   Commonly known as:  LIPITOR    Take 80 mg by mouth every evening.   Dose:  80 mg       ELIQUIS 5mg Tabs   Generic drug:  apixaban    Take 5 mg by mouth 2 Times a Day.   Dose:  5 mg       furosemide 20 MG Tabs   Commonly known as:  LASIX    Take 20 mg by mouth 2 times a day.   Dose:  20 mg       LEVEMIR FLEXPEN 100 UNIT/ML Sopn injection   Generic drug:  insulin detemir    Inject 20 Units as instructed 2 times a day.   Dose:  20 Units       metoprolol " 25 MG Tabs   Commonly known as:  LOPRESSOR    Take 25 mg by mouth 2 times a day.   Dose:  25 mg       NOVOLOG (insulin aspart) 100 UNIT/ML Sopn injection   Commonly known as:  NOVOLOG FLEXPEN    Use TID AC per sliding scale.  70   - 150  mg/dL =      0 Units  151 - 200  mg/dL =    2 Units  201 - 250  mg/dL =    3 Units  251 - 300  mg/dL  =   5 Units  301 - 350  mg/dL  =   6 Units  351 - 400 mg/dL   =   8 Units  Over 400 mg/dL   =   9 Units       omeprazole 20 MG delayed-release capsule   Commonly known as:  PRILOSEC    Take 1 Cap by mouth every day.   Dose:  20 mg       oxycodone-acetaminophen 5-325 MG Tabs   Commonly known as:  PERCOCET    Take 1-2 Tabs by mouth every four hours as needed.   Dose:  1-2 Tab       vitamin D 1000 UNIT Tabs   Commonly known as:  cholecalciferol    Take 1,000 Units by mouth every day.   Dose:  1000 Units

## 2017-06-05 NOTE — IP AVS SNAPSHOT
PrintFu Access Code: [email or web address]  Expires: 6/10/2017  3:54 PM    Your email address is not on file at [facility].  Email Addresses are required for you to sign up for PrintFu, please contact [phone] to verify your personal information and to provide your email address prior to attempting to register for PrintFu.    Nohemy Pastor  [address] [place] [address]    PrintFu  A secure, online tool to manage your health information     [facility]’s PrintFu® is a secure, online tool that connects you to your personalized health information from the privacy of your home -- day or night - making it very easy for you to manage your healthcare. Once the activation process is completed, you can even access your medical information using the PrintFu dheeraj, which is available for free in the Apple Dheeraj store or Google Play store.     To learn more about PrintFu, visit [email or web address]    There are two levels of access available (as shown below):   My Chart Features  [facility] Primary Care Doctor [facility]  Specialists [facility]  Urgent  Care Non-[facility] Primary Care Doctor   Email your healthcare team securely and privately 24/7 X X X    Manage appointments: schedule your next appointment; view details of past/upcoming appointments X      Request prescription refills. X      View recent personal medical records, including lab and immunizations X X X X   View health record, including health history, allergies, medications X X X X   Read reports about your outpatient visits, procedures, consult and ER notes X X X X   See your discharge summary, which is a recap of your hospital and/or ER visit that includes your diagnosis, lab results, and care plan X X  X     How to register for [employer]:  Once your e-mail address has been verified, follow the following steps to sign up for PrintFu.     1. Go to  [email or web address]  2. Click on the Sign Up Now box, which takes you to the New Member Sign Up page. You will need to  provide the following information:  a. Enter your MILI Access Code exactly as it appears at the top of this page. (You will not need to use this code after you’ve completed the sign-up process. If you do not sign up before the expiration date, you must request a new code.)   b. Enter your date of birth.   c. Enter your home email address.   d. Click Submit, and follow the next screen’s instructions.  3. Create a MILI ID. This will be your MILI login ID and cannot be changed, so think of one that is secure and easy to remember.  4. Create a MILI password. You can change your password at any time.  5. Enter your Password Reset Question and Answer. This can be used at a later time if you forget your password.   6. Enter your e-mail address. This allows you to receive e-mail notifications when new information is available in MILI.  7. Click Sign Up. You can now view your health information.    For assistance activating your MILI account, call [phone]

## 2017-06-05 NOTE — IP AVS SNAPSHOT
" Home Care Instructions                                                                                                                  Name:Nohemy Baumann  Medical Record Number:[id number]  CSN: [id number]    YOB: 1965   Age: 52 y.o.  Sex: female  HT:1.6 m (5' 2.99\") WT: 79.7 kg (175 lb 11.3 oz)          Admit Date: 6/5/2017     Discharge Date:   Today's Date: 6/8/2017  Attending Doctor:  Dieter López M.D.                  Allergies:  Nitroglycerin and Vancomycin            Discharge Instructions       Discharge Instructions    Discharged to home by car with friend. Discharged via wheelchair, hospital escort: Yes.  Special equipment needed: Wheelchair    Be sure to schedule a follow-up appointment with your primary care doctor or any specialists as instructed.     Discharge Plan:   Diet Plan: Discussed  Activity Level: Discussed  Confirmed Follow up Appointment: Patient to Call and Schedule Appointment  Confirmed Symptoms Management: Discussed  Medication Reconciliation Updated: Yes  Influenza Vaccine Indication: Not indicated: Previously immunized this influenza season and > 8 years of age    I understand that a diet low in cholesterol, fat, and sodium is recommended for good health. Unless I have been given specific instructions below for another diet, I accept this instruction as my diet prescription.   Other diet: Low fiber GI soft     Special Instructions: None    · Is patient discharged on Warfarin / Coumadin?   No     · Is patient Post Blood Transfusion?  No    Depression / Suicide Risk    As you are discharged from this [facility] facility, it is important to learn how to keep safe from harming yourself.    Recognize the warning signs:  · Abrupt changes in personality, positive or negative- including increase in energy   · Giving away possessions  · Change in eating patterns- significant weight changes-  positive or negative  · Change in sleeping patterns- unable to sleep or sleeping all the " time   · Unwillingness or inability to communicate  · Depression  · Unusual sadness, discouragement and loneliness  · Talk of wanting to die  · Neglect of personal appearance   · Rebelliousness- reckless behavior  · Withdrawal from people/activities they love  · Confusion- inability to concentrate     If you or a loved one observes any of these behaviors or has concerns about self-harm, here's what you can do:  · Talk about it- your feelings and reasons for harming yourself  · Remove any means that you might use to hurt yourself (examples: pills, rope, extension cords, firearm)  · Get professional help from the community (Mental Health, Substance Abuse, psychological counseling)  · Do not be alone:Call your Safe Contact- someone whom you trust who will be there for you.  · Call your local CRISIS HOTLINE 628-7641 or 579-113-5817  · Call your local Children's Mobile Crisis Response Team [place] (945) 717-7488 or www."[employer]"  · Call the toll free National Suicide Prevention Hotlines   · National Suicide Prevention Lifeline 052-037-BGBW (8254)  · [employer] Hope Line Network 800-SUICIDE (195-5851)        Follow-up Information     1. Follow up with Mohsen Tamasaby, M.D. In 1 week.    Specialty:  Family Medicine    Why:  Admission for UTI and Nausea and Vomiting    Contact information    [address] [address] [phone]          2. Follow up with [facility], Emergency Dept.    Specialty:  Emergency Medicine    Why:  As needed, If symptoms worsen    Contact information    [address] [facility] [address] [phone]         Discharge Medication Instructions:    Below are the medications your physician expects you to take upon discharge:    Review all your home medications and newly ordered medications with your doctor and/or pharmacist. Follow medication instructions as directed by your doctor and/or pharmacist.    Please keep your medication list with  you and share with your physician.               Medication List      START taking these medications        Instructions    Morning Afternoon Evening Bedtime    ampicillin 500 MG Caps   Commonly known as:  PRINCIPEN        Take 1 Cap by mouth every 6 hours for 3 days.   Dose:  500 mg                        omeprazole 20 MG delayed-release capsule   Last time this was given:  20 mg on 6/8/2017  8:17 AM   Commonly known as:  PRILOSEC        Take 1 Cap by mouth every day.   Dose:  20 mg                          CONTINUE taking these medications        Instructions    Morning Afternoon Evening Bedtime    alprazolam 0.5 MG Tabs   Commonly known as:  XANAX        Take 0.5 mg by mouth at bedtime as needed for Sleep.   Dose:  0.5 mg                        amlodipine 5 MG Tabs   Last time this was given:  10 mg on 6/8/2017  8:17 AM   Commonly known as:  NORVASC        Take 5 mg by mouth every day.   Dose:  5 mg                        aspirin 81 MG tablet        Take 81 mg by mouth every day.   Dose:  81 mg                        atorvastatin 80 MG tablet   Commonly known as:  LIPITOR        Take 80 mg by mouth every evening.   Dose:  80 mg                        ELIQUIS 5mg Tabs   Last time this was given:  5 mg on 6/8/2017  8:17 AM   Generic drug:  apixaban        Take 5 mg by mouth 2 Times a Day.   Dose:  5 mg                        furosemide 20 MG Tabs   Commonly known as:  LASIX        Take 20 mg by mouth 2 times a day.   Dose:  20 mg                        LEVEMIR FLEXPEN 100 UNIT/ML Sopn injection   Generic drug:  insulin detemir        Inject 20 Units as instructed 2 times a day.   Dose:  20 Units                        metoprolol 25 MG Tabs   Last time this was given:  25 mg on 6/8/2017  8:17 AM   Commonly known as:  LOPRESSOR        Take 25 mg by mouth 2 times a day.   Dose:  25 mg                        NOVOLOG (insulin aspart) 100 UNIT/ML Sopn injection   Commonly known as:  NOVOLOG FLEXPEN        Use TID AC per  sliding scale.  70   - 150  mg/dL =      0 Units  151 - 200  mg/dL =    2 Units  201 - 250  mg/dL =    3 Units  251 - 300  mg/dL  =   5 Units  301 - 350  mg/dL  =   6 Units  351 - 400 mg/dL   =   8 Units  Over 400 mg/dL   =   9 Units                        oxycodone-acetaminophen 5-325 MG Tabs   Commonly known as:  PERCOCET        Take 1-2 Tabs by mouth every four hours as needed.   Dose:  1-2 Tab                        vitamin D 1000 UNIT Tabs   Commonly known as:  cholecalciferol        Take 1,000 Units by mouth every day.   Dose:  1000 Units                          STOP taking these medications     clopidogrel 75 MG Tabs   Commonly known as:  PLAVIX                    Where to Get Your Medications      These medications were sent to [facility] [address] [place] [address] AT [facility]  [address] [facility] [address]     Phone:  [phone]    - ampicillin 500 MG Caps  - omeprazole 20 MG delayed-release capsule            Instructions           Diet / Nutrition:    Follow any diet instructions given to you by your doctor or the dietician, including how much salt (sodium) you are allowed each day.    If you are overweight, talk to your doctor about a weight reduction plan.    Activity:    Remain physically active following your doctor's instructions about exercise and activity.    Rest often.     Any time you become even a little tired or short of breath, SIT DOWN and rest.    Worsening Symptoms:    Report any of the following signs and symptoms to the doctor's office immediately:    *Pain of jaw, arm, or neck  *Chest pain not relieved by medication                               *Dizziness or loss of consciousness  *Difficulty breathing even when at rest   *More tired than usual                                       *Bleeding drainage or swelling of surgical site  *Swelling of feet, ankles, legs or stomach                 *Fever (>100ºF)  *Pink or blood tinged  sputum  *Weight gain (3lbs/day or 5lbs /week)           *Shock from internal defibrillator (if applicable)  *Palpitations or irregular heartbeats                *Cool and/or numb extremities    Stroke Awareness    Common Risk Factors for Stroke include:    Age  Atrial Fibrillation  Carotid Artery Stenosis  Diabetes Mellitus  Excessive alcohol consumption  High blood pressure  Overweight   Physical inactivity  Smoking    Warning signs and symptoms of a stroke include:    *Sudden numbness or weakness of the face, arm or leg (especially on one side of the body).  *Sudden confusion, trouble speaking or understanding.  *Sudden trouble seeing in one or both eyes.  *Sudden trouble walking, dizziness, loss of balance or coordination.Sudden severe headache with no known cause.    It is very important to get treatment quickly when a stroke occurs. If you experience any of the above warning signs, call 911 immediately.                   Disclaimer         Quit Smoking / Tobacco Use:    I understand the use of any tobacco products increases my chance of suffering from future heart disease or stroke and could cause other illnesses which may shorten my life. Quitting the use of tobacco products is the single most important thing I can do to improve my health. For further information on smoking / tobacco cessation call a Toll Free Quit Line at 1-733.224.5232 (*National Cancer [place]) or 1-782.427.3129 (American Lung Association) or you can access the web based program at www.lungusa.org.    [place] Tobacco Users Help Line:  (400) 261-2571       Toll Free: 1-105.385.6135  Quit Tobacco Program [facility] (005)709-3397    Crisis Hotline:    [place] Crisis Hotline:  1-273-GHSDGKV or 1-756.417.6502    [place] Crisis Hotline:    1-206.467.9037 or 701-885-6332    Discharge Survey:   Thank you for choosing [facility]. We hope we did everything we could to make your hospital stay a pleasant one. You may be  receiving a phone survey and we would appreciate your time and participation in answering the questions. Your input is very valuable to us in our efforts to improve our service to our patients and their families.        My signature on this form indicates that:    1. I have reviewed and understand the above information.  2. My questions regarding this information have been answered to my satisfaction.  3. I have formulated a plan with my discharge nurse to obtain my prescribed medications for home.                  Disclaimer         __________________________________                     __________       ________                       Patient Signature                                                 Date                    Time

## 2017-06-06 ENCOUNTER — APPOINTMENT (OUTPATIENT)
Dept: RADIOLOGY | Facility: MEDICAL CENTER | Age: 52
DRG: 699 | End: 2017-06-06
Attending: HOSPITALIST
Payer: MEDICARE

## 2017-06-06 PROBLEM — N18.9 ACUTE-ON-CHRONIC KIDNEY INJURY (HCC): Status: ACTIVE | Noted: 2017-06-06

## 2017-06-06 PROBLEM — E44.0 MODERATE PROTEIN-CALORIE MALNUTRITION (HCC): Status: ACTIVE | Noted: 2017-06-06

## 2017-06-06 PROBLEM — D64.9 NORMOCYTIC ANEMIA: Status: ACTIVE | Noted: 2017-06-06

## 2017-06-06 PROBLEM — N17.9 ACUTE-ON-CHRONIC KIDNEY INJURY (HCC): Status: ACTIVE | Noted: 2017-06-06

## 2017-06-06 LAB
ANION GAP SERPL CALC-SCNC: 12 MMOL/L (ref 0–11.9)
ANION GAP SERPL CALC-SCNC: 8 MMOL/L (ref 0–11.9)
BASOPHILS # BLD AUTO: 0.2 % (ref 0–1.8)
BASOPHILS # BLD: 0.02 K/UL (ref 0–0.12)
BUN SERPL-MCNC: 23 MG/DL (ref 8–22)
BUN SERPL-MCNC: 26 MG/DL (ref 8–22)
CALCIUM SERPL-MCNC: 7.8 MG/DL (ref 8.5–10.5)
CALCIUM SERPL-MCNC: 8.1 MG/DL (ref 8.5–10.5)
CHLORIDE SERPL-SCNC: 112 MMOL/L (ref 96–112)
CHLORIDE SERPL-SCNC: 112 MMOL/L (ref 96–112)
CO2 SERPL-SCNC: 18 MMOL/L (ref 20–33)
CO2 SERPL-SCNC: 20 MMOL/L (ref 20–33)
CREAT SERPL-MCNC: 1.21 MG/DL (ref 0.5–1.4)
CREAT SERPL-MCNC: 1.42 MG/DL (ref 0.5–1.4)
EOSINOPHIL # BLD AUTO: 0.07 K/UL (ref 0–0.51)
EOSINOPHIL NFR BLD: 0.9 % (ref 0–6.9)
ERYTHROCYTE [DISTWIDTH] IN BLOOD BY AUTOMATED COUNT: 48.7 FL (ref 35.9–50)
EST. AVERAGE GLUCOSE BLD GHB EST-MCNC: 151 MG/DL
FERRITIN SERPL-MCNC: 711.9 NG/ML (ref 10–291)
GFR SERPL CREATININE-BSD FRML MDRD: 39 ML/MIN/1.73 M 2
GFR SERPL CREATININE-BSD FRML MDRD: 47 ML/MIN/1.73 M 2
GLUCOSE BLD-MCNC: 133 MG/DL (ref 65–99)
GLUCOSE BLD-MCNC: 145 MG/DL (ref 65–99)
GLUCOSE BLD-MCNC: 150 MG/DL (ref 65–99)
GLUCOSE BLD-MCNC: 160 MG/DL (ref 65–99)
GLUCOSE SERPL-MCNC: 170 MG/DL (ref 65–99)
GLUCOSE SERPL-MCNC: 170 MG/DL (ref 65–99)
HBA1C MFR BLD: 6.9 % (ref 0–5.6)
HCT VFR BLD AUTO: 29.2 % (ref 37–47)
HGB BLD-MCNC: 9.1 G/DL (ref 12–16)
IMM GRANULOCYTES # BLD AUTO: 0.02 K/UL (ref 0–0.11)
IMM GRANULOCYTES NFR BLD AUTO: 0.2 % (ref 0–0.9)
IRON SATN MFR SERPL: 30 % (ref 15–55)
IRON SERPL-MCNC: 42 UG/DL (ref 40–170)
LYMPHOCYTES # BLD AUTO: 1.59 K/UL (ref 1–4.8)
LYMPHOCYTES NFR BLD: 19.6 % (ref 22–41)
MAGNESIUM SERPL-MCNC: 1.4 MG/DL (ref 1.5–2.5)
MCH RBC QN AUTO: 28.5 PG (ref 27–33)
MCHC RBC AUTO-ENTMCNC: 31.2 G/DL (ref 33.6–35)
MCV RBC AUTO: 91.5 FL (ref 81.4–97.8)
MONOCYTES # BLD AUTO: 0.38 K/UL (ref 0–0.85)
MONOCYTES NFR BLD AUTO: 4.7 % (ref 0–13.4)
NEUTROPHILS # BLD AUTO: 6.04 K/UL (ref 2–7.15)
NEUTROPHILS NFR BLD: 74.4 % (ref 44–72)
NRBC # BLD AUTO: 0 K/UL
NRBC BLD AUTO-RTO: 0 /100 WBC
PLATELET # BLD AUTO: 266 K/UL (ref 164–446)
PMV BLD AUTO: 8.5 FL (ref 9–12.9)
POTASSIUM SERPL-SCNC: 3.4 MMOL/L (ref 3.6–5.5)
POTASSIUM SERPL-SCNC: 3.5 MMOL/L (ref 3.6–5.5)
RBC # BLD AUTO: 3.19 M/UL (ref 4.2–5.4)
SODIUM SERPL-SCNC: 140 MMOL/L (ref 135–145)
SODIUM SERPL-SCNC: 142 MMOL/L (ref 135–145)
TIBC SERPL-MCNC: 139 UG/DL (ref 250–450)
TSH SERPL DL<=0.005 MIU/L-ACNC: 1.53 UIU/ML (ref 0.3–3.7)
WBC # BLD AUTO: 8.1 K/UL (ref 4.8–10.8)

## 2017-06-06 PROCEDURE — 96361 HYDRATE IV INFUSION ADD-ON: CPT

## 2017-06-06 PROCEDURE — 83036 HEMOGLOBIN GLYCOSYLATED A1C: CPT

## 2017-06-06 PROCEDURE — G0378 HOSPITAL OBSERVATION PER HR: HCPCS

## 2017-06-06 PROCEDURE — 36415 COLL VENOUS BLD VENIPUNCTURE: CPT

## 2017-06-06 PROCEDURE — 99226 PR SUBSEQUENT OBSERVATION CARE,LEVEL III: CPT | Performed by: INTERNAL MEDICINE

## 2017-06-06 PROCEDURE — 96366 THER/PROPH/DIAG IV INF ADDON: CPT

## 2017-06-06 PROCEDURE — 700111 HCHG RX REV CODE 636 W/ 250 OVERRIDE (IP): Performed by: HOSPITALIST

## 2017-06-06 PROCEDURE — 74176 CT ABD & PELVIS W/O CONTRAST: CPT

## 2017-06-06 PROCEDURE — 700102 HCHG RX REV CODE 250 W/ 637 OVERRIDE(OP): Performed by: HOSPITALIST

## 2017-06-06 PROCEDURE — 96365 THER/PROPH/DIAG IV INF INIT: CPT

## 2017-06-06 PROCEDURE — 83735 ASSAY OF MAGNESIUM: CPT

## 2017-06-06 PROCEDURE — 96376 TX/PRO/DX INJ SAME DRUG ADON: CPT

## 2017-06-06 PROCEDURE — 85025 COMPLETE CBC W/AUTO DIFF WBC: CPT

## 2017-06-06 PROCEDURE — 80048 BASIC METABOLIC PNL TOTAL CA: CPT

## 2017-06-06 PROCEDURE — 82962 GLUCOSE BLOOD TEST: CPT

## 2017-06-06 PROCEDURE — 96375 TX/PRO/DX INJ NEW DRUG ADDON: CPT

## 2017-06-06 PROCEDURE — 700111 HCHG RX REV CODE 636 W/ 250 OVERRIDE (IP): Performed by: INTERNAL MEDICINE

## 2017-06-06 PROCEDURE — 700101 HCHG RX REV CODE 250: Performed by: INTERNAL MEDICINE

## 2017-06-06 PROCEDURE — 83540 ASSAY OF IRON: CPT

## 2017-06-06 PROCEDURE — A9270 NON-COVERED ITEM OR SERVICE: HCPCS | Performed by: HOSPITALIST

## 2017-06-06 PROCEDURE — 83550 IRON BINDING TEST: CPT

## 2017-06-06 PROCEDURE — 700105 HCHG RX REV CODE 258: Performed by: HOSPITALIST

## 2017-06-06 RX ORDER — SODIUM CHLORIDE AND POTASSIUM CHLORIDE 150; 900 MG/100ML; MG/100ML
INJECTION, SOLUTION INTRAVENOUS CONTINUOUS
Status: DISCONTINUED | OUTPATIENT
Start: 2017-06-06 | End: 2017-06-08 | Stop reason: HOSPADM

## 2017-06-06 RX ORDER — AMLODIPINE BESYLATE 10 MG/1
10 TABLET ORAL DAILY
Status: DISCONTINUED | OUTPATIENT
Start: 2017-06-07 | End: 2017-06-08 | Stop reason: HOSPADM

## 2017-06-06 RX ORDER — MAGNESIUM SULFATE HEPTAHYDRATE 40 MG/ML
4 INJECTION, SOLUTION INTRAVENOUS ONCE
Status: COMPLETED | OUTPATIENT
Start: 2017-06-06 | End: 2017-06-06

## 2017-06-06 RX ORDER — SODIUM CHLORIDE AND POTASSIUM CHLORIDE 300; 900 MG/100ML; MG/100ML
INJECTION, SOLUTION INTRAVENOUS CONTINUOUS
Status: DISCONTINUED | OUTPATIENT
Start: 2017-06-06 | End: 2017-06-06

## 2017-06-06 RX ADMIN — SODIUM CHLORIDE: 9 INJECTION, SOLUTION INTRAVENOUS at 02:02

## 2017-06-06 RX ADMIN — SODIUM CHLORIDE: 9 INJECTION, SOLUTION INTRAVENOUS at 11:58

## 2017-06-06 RX ADMIN — PROCHLORPERAZINE EDISYLATE 10 MG: 5 INJECTION INTRAMUSCULAR; INTRAVENOUS at 18:15

## 2017-06-06 RX ADMIN — PROCHLORPERAZINE EDISYLATE 10 MG: 5 INJECTION INTRAMUSCULAR; INTRAVENOUS at 06:27

## 2017-06-06 RX ADMIN — ONDANSETRON 4 MG: 2 INJECTION INTRAMUSCULAR; INTRAVENOUS at 17:24

## 2017-06-06 RX ADMIN — PROCHLORPERAZINE EDISYLATE 10 MG: 5 INJECTION INTRAMUSCULAR; INTRAVENOUS at 01:59

## 2017-06-06 RX ADMIN — POTASSIUM CHLORIDE AND SODIUM CHLORIDE: 900; 150 INJECTION, SOLUTION INTRAVENOUS at 13:29

## 2017-06-06 RX ADMIN — MAGNESIUM SULFATE HEPTAHYDRATE 4 G: 40 INJECTION, SOLUTION INTRAVENOUS at 17:26

## 2017-06-06 RX ADMIN — PROMETHAZINE HYDROCHLORIDE 25 MG: 25 SUPPOSITORY RECTAL at 20:01

## 2017-06-06 RX ADMIN — PROCHLORPERAZINE EDISYLATE 10 MG: 5 INJECTION INTRAMUSCULAR; INTRAVENOUS at 13:28

## 2017-06-06 RX ADMIN — LABETALOL HYDROCHLORIDE 20 MG: 5 INJECTION, SOLUTION INTRAVENOUS at 19:44

## 2017-06-06 RX ADMIN — MORPHINE SULFATE 2 MG: 4 INJECTION INTRAVENOUS at 06:32

## 2017-06-06 RX ADMIN — PROCHLORPERAZINE EDISYLATE 10 MG: 5 INJECTION INTRAMUSCULAR; INTRAVENOUS at 23:14

## 2017-06-06 ASSESSMENT — ENCOUNTER SYMPTOMS
BACK PAIN: 0
VOMITING: 1
PALPITATIONS: 0
MYALGIAS: 0
HEADACHES: 0
FLANK PAIN: 0
DIAPHORESIS: 0
NERVOUS/ANXIOUS: 0
DEPRESSION: 0
BLURRED VISION: 0
NECK PAIN: 0
SORE THROAT: 0
DIZZINESS: 0
HEARTBURN: 1
DOUBLE VISION: 0
FEVER: 0
SHORTNESS OF BREATH: 0
WEAKNESS: 0
ABDOMINAL PAIN: 1
DIARRHEA: 0
CHILLS: 0
BLOOD IN STOOL: 0
COUGH: 0
NAUSEA: 1
TINGLING: 0

## 2017-06-06 ASSESSMENT — PAIN SCALES - GENERAL
PAINLEVEL_OUTOF10: 5
PAINLEVEL_OUTOF10: 8
PAINLEVEL_OUTOF10: 4

## 2017-06-06 NOTE — PROGRESS NOTES
"Hospital Medicine Interval Note  Date of Service:  6/6/2017    Chief Complaint  52 y.o.-year-old female admitted 6/5/2017 with nausea, vomiting and abdominal pain.    Interval Problem Update  Ongoing nausea today, one episode of emesis earlier this morning. Decreased to clear liquid diet until tolerating PO.   Labs improved w/IVF, continue gentle hydration and recheck am labs.  Iron studies pending.    Consultants/Specialty  none    Disposition  Likely home in next 1-2 days w/continued improvement, able to rom PO and recheck am labs.     Review of Systems   Constitutional: Positive for malaise/fatigue. Negative for fever, chills and diaphoresis.   HENT: Negative for congestion and sore throat.    Eyes: Negative for blurred vision and double vision.   Respiratory: Negative for cough and shortness of breath.    Cardiovascular: Positive for leg swelling (\"the same that it usually is.\"). Negative for chest pain and palpitations.   Gastrointestinal: Positive for heartburn, nausea, vomiting and abdominal pain. Negative for diarrhea and blood in stool.        Reports epigastric \"burning\" and ongoing nausea w/last bout of emesis this am, bilious per pt.   Genitourinary: Negative for dysuria, hematuria and flank pain.   Musculoskeletal: Negative for myalgias, back pain and neck pain.   Neurological: Negative for dizziness, tingling, weakness and headaches.   Psychiatric/Behavioral: Negative for depression. The patient is not nervous/anxious.       Physical Exam Laboratory/Imaging   Filed Vitals:    06/05/17 2350 06/06/17 0400 06/06/17 0734 06/06/17 1131   BP: 153/77 145/75 146/76 165/87   Pulse: 100 99 103 104   Temp: 36 °C (96.8 °F) 36.4 °C (97.6 °F) 37.2 °C (98.9 °F) 36.6 °C (97.9 °F)   Resp: 16 16 16 17   Height:       Weight:       SpO2: 99% 99% 98% 98%     Physical Exam   Constitutional: She is oriented to person, place, and time. She appears well-developed. No distress.   HENT:   Head: Normocephalic and atraumatic. "   Mouth/Throat: Oropharynx is clear and moist.   Eyes: Conjunctivae and EOM are normal. Right eye exhibits no discharge. Left eye exhibits no discharge.   Neck: Normal range of motion. Neck supple. No JVD present.   Cardiovascular: Normal rate, regular rhythm and normal heart sounds.    No murmur heard.  Pulmonary/Chest: Effort normal and breath sounds normal. She has no wheezes. She exhibits no tenderness.   Abdominal: Soft. Bowel sounds are normal. She exhibits no distension. There is no guarding.   Musculoskeletal: Normal range of motion. She exhibits edema (LE bilat, 1-2+ edema, from feet to mid-calf).   Neurological: She is alert and oriented to person, place, and time. No cranial nerve deficit.   Skin: Skin is warm and dry. She is not diaphoretic. There is pallor.   Psychiatric: Her behavior is normal. Judgment and thought content normal.   Flat affect but pleasant and cooperative w/staff   Nursing note and vitals reviewed.   Lab Results   Component Value Date/Time    WBC 8.1 06/06/2017 02:15 AM    HEMOGLOBIN 9.1* 06/06/2017 02:15 AM    HEMATOCRIT 29.2* 06/06/2017 02:15 AM    PLATELET COUNT 266 06/06/2017 02:15 AM     Lab Results   Component Value Date/Time    SODIUM 140 06/06/2017 08:17 AM    POTASSIUM 3.4* 06/06/2017 08:17 AM    CHLORIDE 112 06/06/2017 08:17 AM    CO2 20 06/06/2017 08:17 AM    GLUCOSE 170* 06/06/2017 08:17 AM    BUN 23* 06/06/2017 08:17 AM    CREATININE 1.21 06/06/2017 08:17 AM      Assessment/Plan    * N&V (nausea and vomiting) (present on admission)  Assessment & Plan  Ongoing, decreased to clear liquid diet, advance as tolerated.  Antiemetics, gentle IVF. BMP improved but continue monitoring, am labs.    Type 2 diabetes mellitus with nephropathy (CMS-HCC) (present on admission)  Assessment & Plan  Moderately controlled. Continue iss, diabetic diet, HgbA1C pending.  Avoid nephrotoxins.    Hypokalemia (present on admission)  Assessment & Plan  Mild, replete, am labs.    Moderate  protein-calorie malnutrition (CMS-HCC) (present on admission)  Assessment & Plan  Poor PO intake recently.  Nutrition consult placed, dietary education as able, monitor.    Normocytic anemia (present on admission)  Assessment & Plan  Fe studies placed; pending, VS stable, no active bleed, monitor, am labs.    Acute-on-chronic kidney injury (CMS-HCC) (present on admission)  Assessment & Plan  Improved w/IVF, clear liquid diet.  Hx of Stage V renal failure, temporary dialysis and recovery. Will monitor closely.  Avoid nephrotoxins, am labs.     Labs reviewed and Medications reviewed  Abbasi catheter: No Abbasi      DVT: Plavix and Eliquis.  DVT prophylaxis - mechanical: SCDs  Ulcer prophylaxis: Yes    Assessed for rehab: Patient returned to prior level of function, rehabilitation not indicated at this time

## 2017-06-06 NOTE — ASSESSMENT & PLAN NOTE
Ongoing, decreased to clear liquid diet, advance as tolerated.  Antiemetics, gentle IVF. BMP improved but continue monitoring, am labs.

## 2017-06-06 NOTE — ASSESSMENT & PLAN NOTE
Improved w/IVF, clear liquid diet.  Hx of Stage V renal failure, temporary dialysis and recovery. Will monitor closely.  Avoid nephrotoxins, am labs.

## 2017-06-06 NOTE — PROGRESS NOTES
Assessed pt. A&O x4. Denies n/t, pain, c/o nausea, states improved and tolerable at this time. POC discussed with pt. Hourly rounding in place.

## 2017-06-06 NOTE — PROGRESS NOTES
CT tech called states pt needs oral contrast, however pt unable to tolerate oral and refuses. Dr. Ca notified. CT without contrast ok per Dr. Ca. CT tech notified.

## 2017-06-06 NOTE — PROGRESS NOTES
Pt arrived to unit via w/c from main ER. Assessment completed-see flowchart. Pt A&O. Pt denies pain at this time. Pt continues to have nausea and vomiting, bright red blood noted in emesis bag. Pt placed on 1L n/c, pt desating to 85% when on RA. Pt oriented to unit routine and call light. Plan of care reviewed with patient, verbalized understating. Monitors applied, pt hypertensive. Will continue to monitor, call light within reach. Needs met.

## 2017-06-06 NOTE — PROGRESS NOTES
Report received from Michelle BELTRÁN. Pt resting in bed. Bed in low position, call light within reach.

## 2017-06-06 NOTE — PROGRESS NOTES
Assessment completed. Pt A&O. Respirations are even and unlabored on 1L n/c. POC updated. Pt educated on room and call light, pt verbalized understanding. Pt reports minimal abdominal pain at this time, 5/10 and tolerable at this time. Pt vomiting less but continues to have nausea. Monitors applied, call light within reach. Needs met.

## 2017-06-06 NOTE — PROGRESS NOTES
Pt back from CT. Coughing up some pink froth. C/o nausea, med administered per MAR. Diet lemon-lime provided per request.

## 2017-06-06 NOTE — H&P
DATE OF ADMISSION:  06/05/2017    PRIMARY CARE PHYSICIAN:  Mohsen Tamasaby, MD    NEPHROLOGIST:  Dr. Lee    CHIEF COMPLAINT:  Nausea and vomiting.    HISTORY OF PRESENT ILLNESS:  A 52-year-old female.  She has a complicated past   medical history, which includes insulin-dependent diabetes, chronic kidney   disease at one time on dialysis, recovered approximately a month ago, and   hypertension.  Patient reports 5-6 days of severe nausea and vomiting.  She   has thrown up innumerable times.  Her vomit was initially mucus like and it   has become progressively more bilious.  This has been associated with some   epigastric abdominal pain.  She has not noted any blood in her stool or dark   tarry stools.  She has not had similar symptoms in the past.  Her blood sugars   have been relatively well controlled between 100 and 200 at home and she has   had difficulty taking her medications due to the nausea and vomiting.  No   fevers or chills, no diarrhea, no constipation.  She does have a relative who   recently had gastroenteritis that resolved relatively quickly.  The patient   does not take any NSAIDs, does not drink alcohol.  She was worked up for chest   pain on 05/10/2017.  Workup included a nuclear medicine stress test with no   inducible ischemia.    PAST MEDICAL HISTORY:  1.  Chronic kidney disease at one time stage V, she has recovered to stage II   to III.  2.  Insulin-dependent diabetes with complications of renal failure and   neuropathy.  3.  History of DVT, on Eliquis.  4.  Coronary artery disease status post percutaneous intervention.  5.  Hyperlipidemia.    SOCIAL HISTORY:  She is a lifetime nonsmoker, does not drink alcohol.    FAMILY HISTORY:  Father has diabetes.    ALLERGIES:  NITROGLYCERIN AND VANCOMYCIN.    MEDICATIONS:  Xanax 0.5 mg at bedtime as needed for sleep, amlodipine 5 mg   daily, Eliquis 5 mg twice daily, aspirin 81 mg daily, atorvastatin 80 mg in   the evening, Plavix 75 mg daily,  Lasix 20 mg twice daily, Levemir 20 units   twice daily, metoprolol 25 mg twice daily, NovoLog FlexPen by sliding scale,   oxycodone 5/325 mg every 4 hours as needed, vitamin D supplement.    PHYSICAL EXAMINATION:  VITAL SIGNS:  Temperature 36.6, blood pressure 145/84, pulse 95, respirations   18, saturating 90% on 1 liter by nasal cannula.  GENERAL:  The patient is well developed, well nourished, in no apparent   distress.  EYES:  Pupils are equal, round, and reactive, anicteric sclerae.  Moist   conjunctivae with no lid lag.  HEENT:  Normocephalic and atraumatic.  Mucous membranes are moist.  NECK:  Trachea midline.  Supple with no thyromegaly.  LUNGS:  Normal respiratory effort, clear to auscultation bilaterally, no   wheezing, no crackles.  CARDIOVASCULAR:  Regular rate and rhythm.  No murmurs, rubs or gallops.  PMI   is nondisplaced.  ABDOMEN:  Soft and nontender.  No masses, no hepatosplenomegaly.  EXTREMITIES:  No clubbing, no cyanosis.  She has 1+ edema to the mid calf   bilaterally.  SKIN:  Normal temperature and turgor.  No rash.    LABORATORY DATA:  White blood cell count 9.3, hemoglobin 10, platelets 309.    Sodium is 141, potassium 3.6, BUN 29, creatinine 1.68 and lipase 25.    ASSESSMENT AND PLAN:  A 52-year-old female who presents with intractable   nausea and vomiting.  1.  Intractable nausea and vomiting.  Patient has epigastric pain as well as   intractable nausea and vomiting.  She has a history of cholecystectomy.    Differential diagnosis could include acute gastroenteritis, gastroparesis and   bowel obstruction.  I have ordered a CT scan without contrast to evaluate her   abdomen and provided supportive care and gently hydrate.  2.  Acute on chronic renal failure.  Patient's new baseline creatinine is   probably in chronic kidney disease stage I to II, she has acute renal failure   likely from dehydration; hydrate and monitor.  3.  Insulin-dependent diabetes, she has complications of renal  failure and   neuropathy.  Continue her long-acting insulin as well as sliding scale.  4.  History of deep venous thrombosis, continue Eliquis for now.  5.  Prophylaxis, treatment dose of Eliquis.  No need for additional deep   venous thrombosis prophylaxis.  6.  Full code.    Case was discussed with emergency room physician, Dr. Ortiz.       ____________________________________     MD GANESH SPANN / NTS    DD:  06/05/2017 22:15:03  DT:  06/05/2017 23:14:08    D#:  [id number]  Job#:  [id number]

## 2017-06-07 PROBLEM — B95.2 UTI (URINARY TRACT INFECTION) DUE TO ENTEROCOCCUS: Status: ACTIVE | Noted: 2017-06-07

## 2017-06-07 PROBLEM — N39.0 UTI (URINARY TRACT INFECTION): Status: ACTIVE | Noted: 2017-06-07

## 2017-06-07 PROBLEM — N39.0 UTI (URINARY TRACT INFECTION) DUE TO ENTEROCOCCUS: Status: ACTIVE | Noted: 2017-06-07

## 2017-06-07 LAB
ANION GAP SERPL CALC-SCNC: 8 MMOL/L (ref 0–11.9)
BACTERIA UR CULT: ABNORMAL
BACTERIA UR CULT: ABNORMAL
BUN SERPL-MCNC: 21 MG/DL (ref 8–22)
CALCIUM SERPL-MCNC: 8 MG/DL (ref 8.5–10.5)
CHLORIDE SERPL-SCNC: 113 MMOL/L (ref 96–112)
CO2 SERPL-SCNC: 19 MMOL/L (ref 20–33)
CREAT SERPL-MCNC: 1.35 MG/DL (ref 0.5–1.4)
ERYTHROCYTE [DISTWIDTH] IN BLOOD BY AUTOMATED COUNT: 47 FL (ref 35.9–50)
GFR SERPL CREATININE-BSD FRML MDRD: 41 ML/MIN/1.73 M 2
GLUCOSE BLD-MCNC: 125 MG/DL (ref 65–99)
GLUCOSE BLD-MCNC: 126 MG/DL (ref 65–99)
GLUCOSE BLD-MCNC: 127 MG/DL (ref 65–99)
GLUCOSE BLD-MCNC: 134 MG/DL (ref 65–99)
GLUCOSE SERPL-MCNC: 135 MG/DL (ref 65–99)
HCT VFR BLD AUTO: 26.6 % (ref 37–47)
HGB BLD-MCNC: 8.5 G/DL (ref 12–16)
MAGNESIUM SERPL-MCNC: 2.2 MG/DL (ref 1.5–2.5)
MCH RBC QN AUTO: 28.7 PG (ref 27–33)
MCHC RBC AUTO-ENTMCNC: 32 G/DL (ref 33.6–35)
MCV RBC AUTO: 89.9 FL (ref 81.4–97.8)
PLATELET # BLD AUTO: 258 K/UL (ref 164–446)
PMV BLD AUTO: 8.2 FL (ref 9–12.9)
POTASSIUM SERPL-SCNC: 3.4 MMOL/L (ref 3.6–5.5)
RBC # BLD AUTO: 2.96 M/UL (ref 4.2–5.4)
SIGNIFICANT IND 70042: ABNORMAL
SITE SITE: ABNORMAL
SODIUM SERPL-SCNC: 140 MMOL/L (ref 135–145)
SOURCE SOURCE: ABNORMAL
WBC # BLD AUTO: 8.1 K/UL (ref 4.8–10.8)

## 2017-06-07 PROCEDURE — 96376 TX/PRO/DX INJ SAME DRUG ADON: CPT

## 2017-06-07 PROCEDURE — 700101 HCHG RX REV CODE 250: Performed by: INTERNAL MEDICINE

## 2017-06-07 PROCEDURE — A9270 NON-COVERED ITEM OR SERVICE: HCPCS | Performed by: HOSPITALIST

## 2017-06-07 PROCEDURE — 80048 BASIC METABOLIC PNL TOTAL CA: CPT

## 2017-06-07 PROCEDURE — 96367 TX/PROPH/DG ADDL SEQ IV INF: CPT

## 2017-06-07 PROCEDURE — 82962 GLUCOSE BLOOD TEST: CPT | Mod: 91

## 2017-06-07 PROCEDURE — 99225 PR SUBSEQUENT OBSERVATION CARE,LEVEL II: CPT | Performed by: INTERNAL MEDICINE

## 2017-06-07 PROCEDURE — 700111 HCHG RX REV CODE 636 W/ 250 OVERRIDE (IP): Performed by: NURSE PRACTITIONER

## 2017-06-07 PROCEDURE — 700102 HCHG RX REV CODE 250 W/ 637 OVERRIDE(OP): Performed by: HOSPITALIST

## 2017-06-07 PROCEDURE — 700111 HCHG RX REV CODE 636 W/ 250 OVERRIDE (IP): Performed by: HOSPITALIST

## 2017-06-07 PROCEDURE — 85027 COMPLETE CBC AUTOMATED: CPT

## 2017-06-07 PROCEDURE — A9270 NON-COVERED ITEM OR SERVICE: HCPCS | Performed by: INTERNAL MEDICINE

## 2017-06-07 PROCEDURE — 83735 ASSAY OF MAGNESIUM: CPT

## 2017-06-07 PROCEDURE — 36415 COLL VENOUS BLD VENIPUNCTURE: CPT

## 2017-06-07 PROCEDURE — 96361 HYDRATE IV INFUSION ADD-ON: CPT

## 2017-06-07 PROCEDURE — 770006 HCHG ROOM/CARE - MED/SURG/GYN SEMI*

## 2017-06-07 PROCEDURE — 700105 HCHG RX REV CODE 258: Performed by: NURSE PRACTITIONER

## 2017-06-07 PROCEDURE — 700102 HCHG RX REV CODE 250 W/ 637 OVERRIDE(OP): Performed by: INTERNAL MEDICINE

## 2017-06-07 RX ADMIN — PROMETHAZINE HYDROCHLORIDE 25 MG: 25 TABLET ORAL at 11:51

## 2017-06-07 RX ADMIN — PROMETHAZINE HYDROCHLORIDE 25 MG: 25 TABLET ORAL at 16:56

## 2017-06-07 RX ADMIN — ONDANSETRON 4 MG: 4 TABLET, ORALLY DISINTEGRATING ORAL at 10:25

## 2017-06-07 RX ADMIN — AMLODIPINE BESYLATE 10 MG: 10 TABLET ORAL at 08:43

## 2017-06-07 RX ADMIN — CEFTRIAXONE SODIUM 2 G: 2 INJECTION, POWDER, FOR SOLUTION INTRAMUSCULAR; INTRAVENOUS at 10:26

## 2017-06-07 RX ADMIN — PROMETHAZINE HYDROCHLORIDE 25 MG: 25 SUPPOSITORY RECTAL at 02:10

## 2017-06-07 RX ADMIN — POTASSIUM CHLORIDE AND SODIUM CHLORIDE: 900; 150 INJECTION, SOLUTION INTRAVENOUS at 15:20

## 2017-06-07 RX ADMIN — METOPROLOL TARTRATE 25 MG: 25 TABLET, FILM COATED ORAL at 08:43

## 2017-06-07 RX ADMIN — OMEPRAZOLE 20 MG: 20 CAPSULE, DELAYED RELEASE ORAL at 10:26

## 2017-06-07 RX ADMIN — CLOPIDOGREL 75 MG: 75 TABLET, FILM COATED ORAL at 10:26

## 2017-06-07 RX ADMIN — SENNOSIDES AND DOCUSATE SODIUM 2 TABLET: 8.6; 5 TABLET ORAL at 10:26

## 2017-06-07 RX ADMIN — POTASSIUM CHLORIDE AND SODIUM CHLORIDE: 900; 150 INJECTION, SOLUTION INTRAVENOUS at 04:06

## 2017-06-07 RX ADMIN — PROCHLORPERAZINE EDISYLATE 10 MG: 5 INJECTION INTRAMUSCULAR; INTRAVENOUS at 22:37

## 2017-06-07 RX ADMIN — ONDANSETRON 4 MG: 2 INJECTION INTRAMUSCULAR; INTRAVENOUS at 08:43

## 2017-06-07 RX ADMIN — ONDANSETRON 4 MG: 2 INJECTION INTRAMUSCULAR; INTRAVENOUS at 18:17

## 2017-06-07 RX ADMIN — APIXABAN 5 MG: 5 TABLET, FILM COATED ORAL at 10:26

## 2017-06-07 RX ADMIN — ASPIRIN 81 MG: 81 TABLET ORAL at 10:26

## 2017-06-07 RX ADMIN — PROCHLORPERAZINE EDISYLATE 10 MG: 5 INJECTION INTRAMUSCULAR; INTRAVENOUS at 06:01

## 2017-06-07 ASSESSMENT — ENCOUNTER SYMPTOMS
BLURRED VISION: 0
PALPITATIONS: 0
DIZZINESS: 0
DOUBLE VISION: 0
COUGH: 0
INSOMNIA: 0
HEARTBURN: 1
FEVER: 0
WEAKNESS: 0
NERVOUS/ANXIOUS: 0
SORE THROAT: 0
HEADACHES: 0
SHORTNESS OF BREATH: 0
LOSS OF CONSCIOUSNESS: 0
BACK PAIN: 0
DIARRHEA: 0
NAUSEA: 1
FLANK PAIN: 0
CHILLS: 0
NECK PAIN: 0
ABDOMINAL PAIN: 1
DEPRESSION: 0
VOMITING: 1
TINGLING: 0

## 2017-06-07 ASSESSMENT — PAIN SCALES - GENERAL: PAINLEVEL_OUTOF10: 3

## 2017-06-07 NOTE — PROGRESS NOTES
Pt arrived to floor via bed, requesting emesis bag, no emesis. Pt A&O x4, wheelchair at bedside, pt oriented to unit and RN and CNA numbers provided. Hourly rounding in place.

## 2017-06-07 NOTE — PROGRESS NOTES
Hospital Medicine Interval Note  Date of Service:  6/7/2017    Chief Complaint  52 y.o.-year-old female admitted 6/5/2017 with nausea and vomiting, ongoing.    Interval Problem Update  Urine Culture indicating Group D Enterococcus, initiated Rocephin. Minimal improvement w/nausea and vomiting, poor PO intake, will make inpatient for > 2 midnights treatment and evaluation. Nutrition consult pending.    Consultants/Specialty  none    Disposition  Hopefully improvement w/identification of UTI and initiating ABX. Continue IVF, ABX, am labs and advancing diet as tolerated.     Review of Systems   Constitutional: Positive for malaise/fatigue. Negative for fever and chills.   HENT: Negative for congestion and sore throat.    Eyes: Negative for blurred vision and double vision.   Respiratory: Negative for cough and shortness of breath.    Cardiovascular: Negative for chest pain and palpitations.   Gastrointestinal: Positive for heartburn, nausea, vomiting and abdominal pain. Negative for diarrhea.   Genitourinary: Negative for dysuria and flank pain.        Denies urinary symptoms, despite (+) urine cult   Musculoskeletal: Negative for back pain and neck pain.   Skin: Negative.    Neurological: Negative for dizziness, tingling, loss of consciousness, weakness and headaches.   Psychiatric/Behavioral: Negative for depression. The patient is not nervous/anxious and does not have insomnia.       Physical Exam Laboratory/Imaging   Filed Vitals:    06/06/17 2323 06/07/17 0500 06/07/17 0745 06/07/17 1119   BP: 160/74 161/88 171/87 146/71   Pulse: 89 95 95 80   Temp: 36.6 °C (97.9 °F) 36.8 °C (98.3 °F) 36.2 °C (97.2 °F) 36.2 °C (97.2 °F)   Resp: 16 16 16 16   Height:       Weight:       SpO2: 98% 98% 94% 97%     Physical Exam   Constitutional: She is oriented to person, place, and time. She appears well-developed and well-nourished. She appears distressed (pt ill appearing, brows furrowed).   HENT:   Head: Normocephalic and  atraumatic.   Mouth/Throat: Oropharynx is clear and moist.   Eyes: Conjunctivae and EOM are normal. No scleral icterus.   Neck: Normal range of motion. Neck supple. No JVD present.   Cardiovascular: Normal rate and regular rhythm.    No murmur heard.  Pulmonary/Chest: Effort normal and breath sounds normal. No respiratory distress. She exhibits no tenderness.   Abdominal: Soft. She exhibits no distension. There is tenderness (epigastric, mild). There is no guarding.   Genitourinary:   No CVA tenderness   Musculoskeletal: Normal range of motion. She exhibits no edema.   Neurological: She is alert and oriented to person, place, and time. No cranial nerve deficit.   Skin: Skin is warm and dry.   Psychiatric: She has a normal mood and affect. Her behavior is normal. Judgment and thought content normal.   Cooperative, good eye contact   Nursing note and vitals reviewed.   Lab Results   Component Value Date/Time    WBC 8.1 06/07/2017 02:10 AM    HEMOGLOBIN 8.5* 06/07/2017 02:10 AM    HEMATOCRIT 26.6* 06/07/2017 02:10 AM    PLATELET COUNT 258 06/07/2017 02:10 AM     Lab Results   Component Value Date/Time    SODIUM 140 06/07/2017 02:10 AM    POTASSIUM 3.4* 06/07/2017 02:10 AM    CHLORIDE 113* 06/07/2017 02:10 AM    CO2 19* 06/07/2017 02:10 AM    GLUCOSE 135* 06/07/2017 02:10 AM    BUN 21 06/07/2017 02:10 AM    CREATININE 1.35 06/07/2017 02:10 AM      Assessment/Plan    * N&V (nausea and vomiting) (present on admission)  Assessment & Plan  Ongoing, decreased to clear liquid diet, advance as tolerated.  Antiemetics, gentle IVF. BMP improved but continue monitoring, am labs.    Type 2 diabetes mellitus with nephropathy (CMS-HCC) (present on admission)  Assessment & Plan  Moderately controlled. Continue iss, diabetic diet, HgbA1C pending.  Avoid nephrotoxins.    UTI (urinary tract infection) due to Enterococcus (present on admission)  Assessment & Plan  Urine culture (+) for Group D Enterococcus, initiated Rocephin.  Ongoing  nausea, minimal PO intake, made inpatient for further evaluation and treatment    Hypokalemia (present on admission)  Assessment & Plan  Mild, replete, am labs.    Moderate protein-calorie malnutrition (CMS-HCC) (present on admission)  Assessment & Plan  Poor PO intake recently.  Nutrition consult pending, dietary education as able, monitor.    Normocytic anemia (present on admission)  Assessment & Plan  Fe studies stable, no transfusion needed. VS stable, no active bleed, monitor, am labs.    Acute-on-chronic kidney injury (CMS-HCC) (present on admission)  Assessment & Plan  Improved w/IVF, clear liquid diet.  Hx of Stage V renal failure, temporary dialysis and recovery. Will monitor closely.  Avoid nephrotoxins, am labs.     Labs reviewed and Medications reviewed  Abbasi catheter: No Abbasi      DVT: plavix, Eliquis.  DVT prophylaxis - mechanical: SCDs  Ulcer prophylaxis: Yes    Assessed for rehab: Patient returned to prior level of function, rehabilitation not indicated at this time

## 2017-06-07 NOTE — PROGRESS NOTES
Pt unable to tolerate clear liquids, nausea continues to be uncontrolled.  NP, Dianne, aware: will continue to monitor and provide antiemetics.

## 2017-06-07 NOTE — PROGRESS NOTES
Report received, assumed patient care.  Pt A&OX4.  Assessment completed.  Call light within reach, personal belongings available, bed in lowest position, pt calling for assistance.  Pt reports no pain.  +n, -v.  Communication board updated, POC discussed.  BP high (see flowsheets), APRN aware, will first try scheduled meds, otherwise will use prn.  No additional needs at this time.

## 2017-06-08 VITALS
OXYGEN SATURATION: 99 % | SYSTOLIC BLOOD PRESSURE: 122 MMHG | TEMPERATURE: 98 F | HEIGHT: 63 IN | DIASTOLIC BLOOD PRESSURE: 78 MMHG | HEART RATE: 75 BPM | RESPIRATION RATE: 16 BRPM | WEIGHT: 175.71 LBS | BODY MASS INDEX: 31.13 KG/M2

## 2017-06-08 LAB
ALBUMIN SERPL BCP-MCNC: 2 G/DL (ref 3.2–4.9)
BUN SERPL-MCNC: 18 MG/DL (ref 8–22)
CALCIUM SERPL-MCNC: 8 MG/DL (ref 8.5–10.5)
CHLORIDE SERPL-SCNC: 110 MMOL/L (ref 96–112)
CO2 SERPL-SCNC: 16 MMOL/L (ref 20–33)
CREAT SERPL-MCNC: 1.19 MG/DL (ref 0.5–1.4)
ERYTHROCYTE [DISTWIDTH] IN BLOOD BY AUTOMATED COUNT: 44.2 FL (ref 35.9–50)
GFR SERPL CREATININE-BSD FRML MDRD: 48 ML/MIN/1.73 M 2
GLUCOSE BLD-MCNC: 76 MG/DL (ref 65–99)
GLUCOSE BLD-MCNC: 79 MG/DL (ref 65–99)
GLUCOSE SERPL-MCNC: 81 MG/DL (ref 65–99)
HCT VFR BLD AUTO: 26 % (ref 37–47)
HGB BLD-MCNC: 8.5 G/DL (ref 12–16)
MCH RBC QN AUTO: 28.1 PG (ref 27–33)
MCHC RBC AUTO-ENTMCNC: 32.7 G/DL (ref 33.6–35)
MCV RBC AUTO: 86.1 FL (ref 81.4–97.8)
PHOSPHATE SERPL-MCNC: 3.1 MG/DL (ref 2.5–4.5)
PLATELET # BLD AUTO: 253 K/UL (ref 164–446)
PMV BLD AUTO: 8.7 FL (ref 9–12.9)
POTASSIUM SERPL-SCNC: 3.4 MMOL/L (ref 3.6–5.5)
RBC # BLD AUTO: 3.02 M/UL (ref 4.2–5.4)
SODIUM SERPL-SCNC: 136 MMOL/L (ref 135–145)
WBC # BLD AUTO: 8.1 K/UL (ref 4.8–10.8)

## 2017-06-08 PROCEDURE — 700102 HCHG RX REV CODE 250 W/ 637 OVERRIDE(OP): Performed by: INTERNAL MEDICINE

## 2017-06-08 PROCEDURE — 82962 GLUCOSE BLOOD TEST: CPT | Mod: 91

## 2017-06-08 PROCEDURE — 700105 HCHG RX REV CODE 258: Performed by: NURSE PRACTITIONER

## 2017-06-08 PROCEDURE — A9270 NON-COVERED ITEM OR SERVICE: HCPCS | Performed by: HOSPITALIST

## 2017-06-08 PROCEDURE — A9270 NON-COVERED ITEM OR SERVICE: HCPCS | Performed by: INTERNAL MEDICINE

## 2017-06-08 PROCEDURE — 36415 COLL VENOUS BLD VENIPUNCTURE: CPT

## 2017-06-08 PROCEDURE — 700102 HCHG RX REV CODE 250 W/ 637 OVERRIDE(OP): Performed by: HOSPITALIST

## 2017-06-08 PROCEDURE — 80069 RENAL FUNCTION PANEL: CPT

## 2017-06-08 PROCEDURE — 700111 HCHG RX REV CODE 636 W/ 250 OVERRIDE (IP): Performed by: NURSE PRACTITIONER

## 2017-06-08 PROCEDURE — 700111 HCHG RX REV CODE 636 W/ 250 OVERRIDE (IP): Performed by: INTERNAL MEDICINE

## 2017-06-08 PROCEDURE — 99239 HOSP IP/OBS DSCHRG MGMT >30: CPT | Performed by: INTERNAL MEDICINE

## 2017-06-08 PROCEDURE — 85027 COMPLETE CBC AUTOMATED: CPT

## 2017-06-08 PROCEDURE — 700111 HCHG RX REV CODE 636 W/ 250 OVERRIDE (IP): Performed by: HOSPITALIST

## 2017-06-08 RX ORDER — AMPICILLIN 500 MG/1
500 CAPSULE ORAL EVERY 6 HOURS
Qty: 12 CAP | Refills: 0 | Status: SHIPPED | OUTPATIENT
Start: 2017-06-08 | End: 2017-06-11

## 2017-06-08 RX ORDER — METOCLOPRAMIDE HYDROCHLORIDE 5 MG/ML
5 INJECTION INTRAMUSCULAR; INTRAVENOUS 2 TIMES DAILY
Status: DISCONTINUED | OUTPATIENT
Start: 2017-06-08 | End: 2017-06-08 | Stop reason: HOSPADM

## 2017-06-08 RX ORDER — AMPICILLIN 500 MG/1
500 CAPSULE ORAL EVERY 6 HOURS
Status: DISCONTINUED | OUTPATIENT
Start: 2017-06-08 | End: 2017-06-08 | Stop reason: HOSPADM

## 2017-06-08 RX ORDER — OMEPRAZOLE 20 MG/1
20 CAPSULE, DELAYED RELEASE ORAL DAILY
Qty: 30 CAP | Refills: 1 | Status: SHIPPED | OUTPATIENT
Start: 2017-06-08

## 2017-06-08 RX ADMIN — AMLODIPINE BESYLATE 10 MG: 10 TABLET ORAL at 08:17

## 2017-06-08 RX ADMIN — APIXABAN 5 MG: 5 TABLET, FILM COATED ORAL at 08:17

## 2017-06-08 RX ADMIN — ASPIRIN 81 MG: 81 TABLET ORAL at 08:17

## 2017-06-08 RX ADMIN — METOPROLOL TARTRATE 25 MG: 25 TABLET, FILM COATED ORAL at 08:17

## 2017-06-08 RX ADMIN — CEFTRIAXONE SODIUM 2 G: 2 INJECTION, POWDER, FOR SOLUTION INTRAMUSCULAR; INTRAVENOUS at 08:17

## 2017-06-08 RX ADMIN — ONDANSETRON 4 MG: 2 INJECTION INTRAMUSCULAR; INTRAVENOUS at 06:11

## 2017-06-08 RX ADMIN — AMPICILLIN 500 MG: 500 CAPSULE ORAL at 15:31

## 2017-06-08 RX ADMIN — METOCLOPRAMIDE 5 MG: 5 INJECTION, SOLUTION INTRAMUSCULAR; INTRAVENOUS at 08:17

## 2017-06-08 RX ADMIN — HYDRALAZINE HYDROCHLORIDE 20 MG: 20 INJECTION INTRAMUSCULAR; INTRAVENOUS at 04:55

## 2017-06-08 RX ADMIN — SENNOSIDES AND DOCUSATE SODIUM 2 TABLET: 8.6; 5 TABLET ORAL at 08:16

## 2017-06-08 RX ADMIN — INSULIN GLARGINE 20 UNITS: 100 INJECTION, SOLUTION SUBCUTANEOUS at 08:18

## 2017-06-08 RX ADMIN — PROMETHAZINE HYDROCHLORIDE 25 MG: 25 TABLET ORAL at 08:17

## 2017-06-08 RX ADMIN — OMEPRAZOLE 20 MG: 20 CAPSULE, DELAYED RELEASE ORAL at 08:17

## 2017-06-08 ASSESSMENT — PAIN SCALES - GENERAL
PAINLEVEL_OUTOF10: 0
PAINLEVEL_OUTOF10: 3
PAINLEVEL_OUTOF10: 0

## 2017-06-08 NOTE — PROGRESS NOTES
Report received. Assumed care, assessment complete. Pt is A & O x 4.  Pt reports neuropathy pain and mild nausea. Fall precautions and appropriate signs in place. Pt oriented to unit routine, call light/phone system and RN extension number provided. Pt educated regarding fall precautions. Bed alarm refused with education. Pt denies any additional needs at this time. Call light within reach.

## 2017-06-08 NOTE — PROGRESS NOTES
Assisted in care of patient w/ reference nurse. Pt is sitting calmly in bed. Attempted to eat lunch, but wasn't able to eat much of it. Pt was able to consume a pudding cup. Top bed rails are in place, call light in reach. Continuous monitoring in place.

## 2017-06-08 NOTE — DIETARY
"Nutrition Services: Patient seen for moderate protein malnutrition.  Patient stated her appetite is improving and denied any recent unintentional weight loss.  She stated she is discharging home this afternoon.    52 y.o. female with admitting DX of Nausea and vomiting, UTI (urinary tract infection) due to Enterococcus  Pertinent History: Diabetes, Chronic renal failure    Height: 160 cm (5' 2.99\")  Weight: 79.7 kg (175 lb 11.3 oz)  Body mass index is 31.13 kg/(m^2).    Patient is on a Diabetic, low fiber diet  Labs, medications and past medical history reviewed.  Albumin low at 2.0 likely related to illness/inflammation.    Recommend:  Nutrition Representative will continue to see her for ongoing meal and snack preferences if not discharged.  RD following per department policy.      "

## 2017-06-08 NOTE — DISCHARGE INSTRUCTIONS
Discharge Instructions    Discharged to home by car with friend. Discharged via wheelchair, hospital escort: Yes.  Special equipment needed: Wheelchair    Be sure to schedule a follow-up appointment with your primary care doctor or any specialists as instructed.     Discharge Plan:   Diet Plan: Discussed  Activity Level: Discussed  Confirmed Follow up Appointment: Patient to Call and Schedule Appointment  Confirmed Symptoms Management: Discussed  Medication Reconciliation Updated: Yes  Influenza Vaccine Indication: Not indicated: Previously immunized this influenza season and > 8 years of age    I understand that a diet low in cholesterol, fat, and sodium is recommended for good health. Unless I have been given specific instructions below for another diet, I accept this instruction as my diet prescription.   Other diet: Low fiber GI soft     Special Instructions: None    · Is patient discharged on Warfarin / Coumadin?   No     · Is patient Post Blood Transfusion?  No    Depression / Suicide Risk    As you are discharged from this [facility] facility, it is important to learn how to keep safe from harming yourself.    Recognize the warning signs:  · Abrupt changes in personality, positive or negative- including increase in energy   · Giving away possessions  · Change in eating patterns- significant weight changes-  positive or negative  · Change in sleeping patterns- unable to sleep or sleeping all the time   · Unwillingness or inability to communicate  · Depression  · Unusual sadness, discouragement and loneliness  · Talk of wanting to die  · Neglect of personal appearance   · Rebelliousness- reckless behavior  · Withdrawal from people/activities they love  · Confusion- inability to concentrate     If you or a loved one observes any of these behaviors or has concerns about self-harm, here's what you can do:  · Talk about it- your feelings and reasons for harming yourself  · Remove any means that you might use to  hurt yourself (examples: pills, rope, extension cords, firearm)  · Get professional help from the community (Mental Health, Substance Abuse, psychological counseling)  · Do not be alone:Call your Safe Contact- someone whom you trust who will be there for you.  · Call your local CRISIS HOTLINE 689-9030 or 488-988-3663  · Call your local Children's Mobile Crisis Response Team [place] (681) 413-3984 or www.DiBcom  · Call the toll free National Suicide Prevention Hotlines   · National Suicide Prevention Lifeline 242-058-MWNY (9660)  · National Hope Line Network 800-SUICIDE (932-5524)

## 2017-06-08 NOTE — CARE PLAN
Problem: Knowledge Deficit  Goal: Knowledge of disease process/condition, treatment plan, diagnostic tests, and medications will improve  POC: IV Rocephin, monitor labs    Problem: Discharge Barriers/Planning  Goal: Patient’s continuum of care needs will be met  Resume HH order upon DC

## 2017-06-08 NOTE — PROGRESS NOTES
Pt D/C'd. PIV removed.  Discharge instructions provided to pt.  Pt states understanding.  Pt states all questions have been answered.  Copy of discharge provided to pt.  Signed copy in chart.  Prescriptions provided to pt. Pt states that all personal belongings are in possession.

## 2017-06-08 NOTE — CARE PLAN
Problem: Venous Thromboembolism (VTW)/Deep Vein Thrombosis (DVT) Prevention:  Goal: Patient will participate in Venous Thrombosis (VTE)/Deep Vein Thrombosis (DVT)Prevention Measures  Intervention: Assess and monitor for anticoagulation complications  Pt anemic, on apixaban for DVT prevention, pt monitored for signs of bleeding       Problem: Mobility  Goal: Risk for activity intolerance will decrease  Intervention: Assess and monitor signs of activity intolerance  Pt transferring from bed to chair with 1-2 person assist

## 2017-06-09 NOTE — DISCHARGE SUMMARY
DATE OF ADMISSION:  06/05/2017    DATE OF DISCHARGE:  06/08/2017    DISCHARGE DIAGNOSES:  1.  Intractable nausea and vomiting, likely multifactorial.  2.  Type 2 diabetes mellitus with nephropathy.  3.  Urinary tract infection with enterococcus, pansensitive group D.  4.  Hypokalemia.  5.  Moderate protein-calorie malnutrition.  6.  Acute on chronic anemia, normocytic.  Iron studies normal.  7.  Acute on chronic kidney injury.  8.  Dehydration.  9.  History of coronary artery disease, status post stents in the past.    CONSULTATIONS DONE ON THIS HOSPITAL STAY:  None.    INTERVENTIONS DONE ON THIS HOSPITAL STAY:  None.    BLOOD OR BLOOD PRODUCTS RECEIVED ON THIS HOSPITAL STAY:  None.    DIAGNOSTIC EVALUATIONS PERFORMED ON THIS HOSPITAL STAY:  IMAGING:  CT abdomen   and pelvis without contrast.  No renal stone or hydronephrosis.  Normal   appendix.  Probable left adrenal myelolipoma measuring 3.2 cm.  Moderate   pleural fluid collections with atelectasis and mild diffuse body wall edema.    COURSE OF HOSPITALIZATION:  The patient is a 52-year-old female with history   of multiple medical problems, came in with nausea and vomiting.  She was   admitted under my colleague, Dr. Manuel aC, and please refer to his H and P   for further details on 6/5/2017.  Patient was admitted to the telemetry floor.    She had pretty impressive epigastric pain as well as intractable nausea and   vomiting.  CT abdomen and pelvis was unremarkable as outlined above.  There   was concern for possible concurrent gastroparesis.  The patient was hydrated.    Electrolytes were aggressively replaced.  Her mild MOSHE improved tremendously   with fluids.  At the day of admission, her creatinine was 1.68 and down   trended to 1.19.  Her blood sugars remained well controlled here.  Her   glycohemoglobin was 6.9, which indicates decent control at home.  She does   have a history of apparently requiring intermittent temporary dialysis in the   past, so  this was done and she regained full function.  Additionally, she is   on Eliquis as well as aspirin and Plavix.  She did have a stent, which was   placed 4 years ago and given that her hemoglobin dropped slightly during this   admission from  without evidence of overt bleeding and fecal occult blood   test negative.  This could be somewhat hemodilutional, though could be   indicative of an occult GI bleed.  I stopped the Plavix given she is 4 years   out from the last stent.  Continued low dose aspirin, continued Eliquis given   recent DVT diagnosed earlier this year.  The etiology of DVT at this time is   unclear, which she can likely get a repeat ultrasound done in approximately   1-2 months with a D-dimer and could eventually come off Eliquis.  Patient was   able to tolerate a p.o. diet on day of discharge and she needs followup with   primary care physician and possible referral to GI doctor at that time.    DISPOSITION:  Discharged home.    DIET:  Low fat, low sugar, heart healthy diet.    ACTIVITIES:  As tolerated.    FOLLOWUP:  Patient has a followup appointment for primary care physician in 1   week.    MEDICATIONS AT TIME OF DISCHARGE:  1.  Xanax 0.5 mg tablets at bedtime as needed for sleep.  2.  Amlodipine 5 mg tablet a day.  3.  Ampicillin 500 mg tablets every 6 hours for 3 more days for complicated   UTI.  4.  Apixaban 5 mg tablets b.i.d.  5.  Aspirin 81 mg daily.  6.  Atorvastatin 80 mg tablets every evening.  7.  Stop taking Plavix.  8.  Lasix 20 mg tablets b.i.d.  9.  Detemir 20 units b.i.d.  10.  Metoprolol 25 mg tablets b.i.d.  11.  NovoLog sliding scale with meals.  12.  Omeprazole 20 mg tablets everyday.  13.  Percocet 5/325 mg tabs 1-2 tabs every 4 hours as needed for pain, no   refills were given.  14.  Vitamin D 1000 units every day.    DISCHARGE INSTRUCTIONS:  The patient at this point is advised no smoking, no   alcohol, no caffeine, wear seatbelt in a motorized vehicle, keep appointments    as scheduled.  If symptoms worsen or new ones develop, call the primary care's   office, 911, or nearest urgent care facility.    Discharge process lasted approximately 35 minutes.       ____________________________________     MD CHAYA STEVENS / GODFREY    DD:  06/08/2017 15:21:47  DT:  06/08/2017 23:19:14    D#:  [id number]  Job#:  [id number]    cc: MOHSEN TAMASABY MD

## 2017-06-15 LAB — EKG IMPRESSION: NORMAL

## 2017-06-22 LAB — EKG IMPRESSION: NORMAL

## 2017-07-18 ENCOUNTER — HOSPITAL ENCOUNTER (EMERGENCY)
Facility: MEDICAL CENTER | Age: 52
End: 2017-07-18
Attending: EMERGENCY MEDICINE
Payer: MEDICARE

## 2017-07-18 VITALS
HEIGHT: 63 IN | RESPIRATION RATE: 16 BRPM | HEART RATE: 79 BPM | TEMPERATURE: 98.4 F | BODY MASS INDEX: 32.96 KG/M2 | OXYGEN SATURATION: 96 % | WEIGHT: 186 LBS | SYSTOLIC BLOOD PRESSURE: 153 MMHG | DIASTOLIC BLOOD PRESSURE: 74 MMHG

## 2017-07-18 DIAGNOSIS — E13.65 OTHER SPECIFIED DIABETES MELLITUS WITH HYPERGLYCEMIA (HCC): ICD-10-CM

## 2017-07-18 DIAGNOSIS — E88.09 HYPOALBUMINEMIA: ICD-10-CM

## 2017-07-18 DIAGNOSIS — R60.0 BILATERAL EDEMA OF LOWER EXTREMITY: ICD-10-CM

## 2017-07-18 LAB
ALBUMIN SERPL BCP-MCNC: 2.5 G/DL (ref 3.2–4.9)
ALBUMIN/GLOB SERPL: 0.8 G/DL
ALP SERPL-CCNC: 100 U/L (ref 30–99)
ALT SERPL-CCNC: 24 U/L (ref 2–50)
ANION GAP SERPL CALC-SCNC: 7 MMOL/L (ref 0–11.9)
AST SERPL-CCNC: 25 U/L (ref 12–45)
BASOPHILS # BLD AUTO: 0.3 % (ref 0–1.8)
BASOPHILS # BLD: 0.02 K/UL (ref 0–0.12)
BILIRUB SERPL-MCNC: 0.3 MG/DL (ref 0.1–1.5)
BUN SERPL-MCNC: 38 MG/DL (ref 8–22)
CALCIUM SERPL-MCNC: 9.1 MG/DL (ref 8.5–10.5)
CHLORIDE SERPL-SCNC: 110 MMOL/L (ref 96–112)
CO2 SERPL-SCNC: 20 MMOL/L (ref 20–33)
CREAT SERPL-MCNC: 1.54 MG/DL (ref 0.5–1.4)
EOSINOPHIL # BLD AUTO: 0.24 K/UL (ref 0–0.51)
EOSINOPHIL NFR BLD: 3.9 % (ref 0–6.9)
ERYTHROCYTE [DISTWIDTH] IN BLOOD BY AUTOMATED COUNT: 49.4 FL (ref 35.9–50)
GFR SERPL CREATININE-BSD FRML MDRD: 35 ML/MIN/1.73 M 2
GLOBULIN SER CALC-MCNC: 3.2 G/DL (ref 1.9–3.5)
GLUCOSE SERPL-MCNC: 251 MG/DL (ref 65–99)
HCT VFR BLD AUTO: 30.8 % (ref 37–47)
HGB BLD-MCNC: 10.1 G/DL (ref 12–16)
IMM GRANULOCYTES # BLD AUTO: 0.02 K/UL (ref 0–0.11)
IMM GRANULOCYTES NFR BLD AUTO: 0.3 % (ref 0–0.9)
INR PPP: 1.04 (ref 0.87–1.13)
LYMPHOCYTES # BLD AUTO: 1.45 K/UL (ref 1–4.8)
LYMPHOCYTES NFR BLD: 23.8 % (ref 22–41)
MCH RBC QN AUTO: 29.4 PG (ref 27–33)
MCHC RBC AUTO-ENTMCNC: 32.8 G/DL (ref 33.6–35)
MCV RBC AUTO: 89.5 FL (ref 81.4–97.8)
MONOCYTES # BLD AUTO: 0.35 K/UL (ref 0–0.85)
MONOCYTES NFR BLD AUTO: 5.8 % (ref 0–13.4)
NEUTROPHILS # BLD AUTO: 4 K/UL (ref 2–7.15)
NEUTROPHILS NFR BLD: 65.9 % (ref 44–72)
NRBC # BLD AUTO: 0 K/UL
NRBC BLD AUTO-RTO: 0 /100 WBC
PLATELET # BLD AUTO: 263 K/UL (ref 164–446)
PMV BLD AUTO: 8.7 FL (ref 9–12.9)
POTASSIUM SERPL-SCNC: 5.2 MMOL/L (ref 3.6–5.5)
PROT SERPL-MCNC: 5.7 G/DL (ref 6–8.2)
PROTHROMBIN TIME: 13.9 SEC (ref 12–14.6)
RBC # BLD AUTO: 3.44 M/UL (ref 4.2–5.4)
SODIUM SERPL-SCNC: 137 MMOL/L (ref 135–145)
WBC # BLD AUTO: 6.1 K/UL (ref 4.8–10.8)

## 2017-07-18 PROCEDURE — 700111 HCHG RX REV CODE 636 W/ 250 OVERRIDE (IP): Performed by: EMERGENCY MEDICINE

## 2017-07-18 PROCEDURE — 93971 EXTREMITY STUDY: CPT | Mod: 26 | Performed by: SURGERY

## 2017-07-18 PROCEDURE — 85610 PROTHROMBIN TIME: CPT

## 2017-07-18 PROCEDURE — 36415 COLL VENOUS BLD VENIPUNCTURE: CPT

## 2017-07-18 PROCEDURE — 93971 EXTREMITY STUDY: CPT

## 2017-07-18 PROCEDURE — 85025 COMPLETE CBC W/AUTO DIFF WBC: CPT

## 2017-07-18 PROCEDURE — 99284 EMERGENCY DEPT VISIT MOD MDM: CPT

## 2017-07-18 PROCEDURE — 80053 COMPREHEN METABOLIC PANEL: CPT

## 2017-07-18 PROCEDURE — 96374 THER/PROPH/DIAG INJ IV PUSH: CPT

## 2017-07-18 PROCEDURE — 96375 TX/PRO/DX INJ NEW DRUG ADDON: CPT

## 2017-07-18 RX ORDER — ONDANSETRON 2 MG/ML
4 INJECTION INTRAMUSCULAR; INTRAVENOUS ONCE
Status: COMPLETED | OUTPATIENT
Start: 2017-07-18 | End: 2017-07-18

## 2017-07-18 RX ORDER — MORPHINE SULFATE 4 MG/ML
4 INJECTION, SOLUTION INTRAMUSCULAR; INTRAVENOUS ONCE
Status: COMPLETED | OUTPATIENT
Start: 2017-07-18 | End: 2017-07-18

## 2017-07-18 RX ORDER — HYDROCODONE BITARTRATE AND ACETAMINOPHEN 5; 325 MG/1; MG/1
1-2 TABLET ORAL EVERY 8 HOURS PRN
Qty: 20 TAB | Refills: 0 | Status: SHIPPED | OUTPATIENT
Start: 2017-07-18

## 2017-07-18 RX ORDER — FUROSEMIDE 40 MG/1
40 TABLET ORAL EVERY MORNING
Qty: 60 TAB | Refills: 0 | Status: SHIPPED | OUTPATIENT
Start: 2017-07-18

## 2017-07-18 RX ADMIN — MORPHINE SULFATE 4 MG: 4 INJECTION INTRAVENOUS at 13:23

## 2017-07-18 RX ADMIN — ONDANSETRON 4 MG: 2 INJECTION INTRAMUSCULAR; INTRAVENOUS at 13:22

## 2017-07-18 ASSESSMENT — PAIN SCALES - GENERAL: PAINLEVEL_OUTOF10: 7

## 2017-07-18 NOTE — ED AVS SNAPSHOT
7/18/2017    Nohemy Baumann  [address] [place] [address]    Dear Nohemy:    formerly [facility] wants to ensure your discharge home is safe and you or your loved ones have had all of your questions answered regarding your care after you leave the hospital.    Below is a list of resources and contact information should you have any questions regarding your hospital stay, follow-up instructions, or active medical symptoms.    Questions or Concerns Regarding… Contact   Medical Questions Related to Your Discharge  (7 days a week, 8am-5pm) Contact a Nurse Care Coordinator   [phone]   Medical Questions Not Related to Your Discharge  (24 hours a day / 7 days a week)  Contact the Nurse Health Line   [phone]    Medications or Discharge Instructions Refer to your discharge packet   or contact your [facility] Primary Care Provider   [phone]   Follow-up Appointment(s) Schedule your appointment via [facility]   or contact Scheduling [phone]   Billing Review your statement via [facility]  or contact Billing [phone]   Medical Records Review your records via [facility]   or contact Medical Records [phone]     You may receive a telephone call within two days of discharge. This call is to make certain you understand your discharge instructions and have the opportunity to have any questions answered. You can also easily access your medical information, test results and upcoming appointments via the [facility] [employer] free online health management tool. You can learn more and sign up at [employer] [employer]. For assistance setting up your [facility] [employer] account, please call [phone].    Once again, we want to ensure your discharge home is safe and that you have a clear understanding of any next steps in your care. If you have any questions or concerns, please do not hesitate to contact us, we are here for you. Thank you for choosing [facility] for your healthcare needs.    Sincerely,    Your [facility] Healthcare Team

## 2017-07-18 NOTE — ED AVS SNAPSHOT
Home Care Instructions                                                                                                                Nohemy Baumann   MRN: [id number]    Department:  [facility] Emergency Dept   Date of Visit:  7/18/2017            [facility] Dept    [address]    [place] [address]    Phone:  [phone]      You were seen by     Benny Lofton D.O.      Your Diagnosis Was     Bilateral edema of lower extremity     R60.0       These are the medications you received during your hospitalization from 07/18/2017 1019 to 07/18/2017 1450     Date/Time Order Dose Route Action    07/18/2017 1323 morphine (pf) 4 mg/ml injection 4 mg 4 mg Intravenous Given    07/18/2017 1322 ondansetron (ZOFRAN) syringe/vial injection 4 mg 4 mg Intravenous Given      Follow-up Information     1. Schedule an appointment as soon as possible for a visit with Michael Gudino D.O..    Specialty:  Nephrology    Contact information    [address]  [place] [address] [phone]        Medication Information     Review all of your home medications and newly ordered medications with your primary doctor and/or pharmacist as soon as possible. Follow medication instructions as directed by your doctor and/or pharmacist.     Please keep your complete medication list with you and share with your physician. Update the information when medications are discontinued, doses are changed, or new medications (including over-the-counter products) are added; and carry medication information at all times in the event of emergency situations.               Medication List      CHANGE how you take these medications        Instructions    Morning Afternoon Evening Bedtime    * furosemide 20 MG Tabs   What changed:  Another medication with the same name was added. Make sure you understand how and when to take each.   Commonly known as:  LASIX        Take 20 mg by mouth 2 times a day.      Dose:  20 mg                        * furosemide 40 MG Tabs   What changed:  You were already taking a medication with the same name, and this prescription was added. Make sure you understand how and when to take each.   Commonly known as:  LASIX        Take 1 Tab by mouth every morning.   Dose:  40 mg                        * Notice:  This list has 2 medication(s) that are the same as other medications prescribed for you. Read the directions carefully, and ask your doctor or other care provider to review them with you.      CONTINUE taking these medications        Instructions    Morning Afternoon Evening Bedtime    alprazolam 0.5 MG Tabs   Commonly known as:  XANAX        Take 0.5 mg by mouth at bedtime as needed for Sleep.   Dose:  0.5 mg                        amlodipine 5 MG Tabs   Commonly known as:  NORVASC        Take 5 mg by mouth every day.   Dose:  5 mg                        aspirin 81 MG tablet        Take 81 mg by mouth every day.   Dose:  81 mg                        atorvastatin 80 MG tablet   Commonly known as:  LIPITOR        Take 80 mg by mouth every evening.   Dose:  80 mg                        ELIQUIS 5mg Tabs   Generic drug:  apixaban        Take 5 mg by mouth 2 Times a Day.   Dose:  5 mg                        LEVEMIR FLEXPEN 100 UNIT/ML Sopn injection   Generic drug:  insulin detemir        Inject 20 Units as instructed 2 times a day.   Dose:  20 Units                        metoprolol 25 MG Tabs   Commonly known as:  LOPRESSOR        Take 25 mg by mouth 2 times a day.   Dose:  25 mg                        NOVOLOG (insulin aspart) 100 UNIT/ML Sopn injection   Commonly known as:  NOVOLOG FLEXPEN        Use TID AC per sliding scale.  70   - 150  mg/dL =      0 Units  151 - 200  mg/dL =    2 Units  201 - 250  mg/dL =    3 Units  251 - 300  mg/dL  =   5 Units  301 - 350  mg/dL  =   6 Units  351 - 400 mg/dL   =   8 Units  Over 400 mg/dL   =   9 Units                        omeprazole 20 MG  delayed-release capsule   Commonly known as:  PRILOSEC        Take 1 Cap by mouth every day.   Dose:  20 mg                        oxycodone-acetaminophen 5-325 MG Tabs   Commonly known as:  PERCOCET        Take 1-2 Tabs by mouth every four hours as needed.   Dose:  1-2 Tab                        vitamin D 1000 UNIT Tabs   Commonly known as:  cholecalciferol        Take 1,000 Units by mouth every day.   Dose:  1000 Units                             Where to Get Your Medications      You can get these medications from any pharmacy     Bring a paper prescription for each of these medications    - furosemide 40 MG Tabs            Procedures and tests performed during your visit     CBC WITH DIFFERENTIAL    COMP METABOLIC PANEL    ESTIMATED GFR    LE VENOUS DUPLEX    PROTHROMBIN TIME    SALINE LOCK        Discharge Instructions       Edema  Edema is an abnormal buildup of fluids. It is more common in your legs and thighs. Painless swelling of the feet and ankles is more likely as a person ages. It also is common in looser skin, like around your eyes.  HOME CARE   · Keep the affected body part above the level of the heart while lying down.  · Do not sit still or stand for a long time.  · Do not put anything right under your knees when you lie down.  · Do not wear tight clothes on your upper legs.  · Exercise your legs to help the puffiness (swelling) go down.  · Wear elastic bandages or support stockings as told by your doctor.  · A low-salt diet may help lessen the puffiness.  · Only take medicine as told by your doctor.  GET HELP IF:  · Treatment is not working.  · You have heart, liver, or kidney disease and notice that your skin looks puffy or shiny.  · You have puffiness in your legs that does not get better when you raise your legs.  · You have sudden weight gain for no reason.  GET HELP RIGHT AWAY IF:   · You have shortness of breath or chest pain.  · You cannot breathe when you lie down.  · You have pain,  redness, or warmth in the areas that are puffy.  · You have heart, liver, or kidney disease and get edema all of a sudden.  · You have a fever and your symptoms get worse all of a sudden.  MAKE SURE YOU:   · Understand these instructions.  · Will watch your condition.  · Will get help right away if you are not doing well or get worse.     This information is not intended to replace advice given to you by your health care provider. Make sure you discuss any questions you have with your health care provider.     Document Released: 06/05/2009 Document Revised: 12/23/2014 Document Reviewed: 10/10/2014  [employer] Interactive Patient Education ©2016 [employer]    Hyperglycemia  High blood sugar (hyperglycemia) means that the level of sugar in your blood is higher than it should be. Signs of high blood sugar include:  · Feeling thirsty.  · Frequent peeing (urinating).  · Feeling tired or sleepy.  · Dry mouth.  · Vision changes.  · Feeling weak.  · Feeling hungry but losing weight.  · Numbness and tingling in your hands or feet.  · Headache.  When you ignore these signs, your blood sugar may keep going up. These problems may get worse, and other problems may begin.  HOME CARE  · Check your blood sugars as told by your doctor. Write down the numbers with the date and time.  · Take the right amount of insulin or diabetes pills at the right time. Write down the dose with date and time.  · Refill your insulin or diabetes pills before running out.  · Watch what you eat. Follow your meal plan.  · Drink liquids without sugar, such as water. Check with your doctor if you have kidney or heart disease.  · Follow your doctor's orders for exercise. Exercise at the same time of day.  · Keep your doctor's appointments.  GET HELP RIGHT AWAY IF:   · You have trouble thinking or are confused.  · You have fast breathing with fruity smelling breath.  · You pass out (faint).  · You have 2 to 3 days of high blood sugars and you do not know  why.  · You have chest pain.  · You are feeling sick to your stomach (nauseous) or throwing up (vomiting).  · You have sudden vision changes.  MAKE SURE YOU:   · Understand these instructions.  · Will watch your condition.  · Will get help right away if you are not doing well or get worse.     This information is not intended to replace advice given to you by your health care provider. Make sure you discuss any questions you have with your health care provider.     Document Released: 10/15/2010 Document Revised: 01/08/2016 Document Reviewed: 10/15/2010  [employer] Interactive Patient Education ©2016 [employer]            Patient Information     Patient Information    Following emergency treatment: all patient requiring follow-up care must return either to a private physician or a clinic if your condition worsens before you are able to obtain further medical attention, please return to the emergency room.     Billing Information    [facility] we work to make the billing process streamlined for our patients.  Our Representatives are here to answer any questions you may have regarding your hospital bill.  If you have insurance coverage and have supplied your insurance information to us, we will submit a claim to your insurer on your behalf.  Should you have any questions regarding your bill, we can be reached online or by phone as follows:  Online: You are able pay your bills online or live chat with our representatives about any billing questions you may have. We are here to help Monday - Friday from 8:00am to 7:30pm and 9:00am - 12:00pm on Saturdays.  Please visit [email or web address]  for more information.   Phone:  [phone] or [phone]    Please note that your emergency physician, surgeon, pathologist, radiologist, anesthesiologist, and other specialists are not employed by [facility] and will therefore bill separately for their services.  Please contact them directly  for any questions concerning their bills at the numbers below:     Emergency Physician Services:  [phone]  [facility]:  [phone]  [facility]:  [phone]  [facility]:  [phone]    1. Your final bill may vary from the amount quoted upon discharge if all procedures are not complete at that time, or if your doctor has additional procedures of which we are not aware. You will receive an additional bill if you return to the Emergency Department at [facility] for suture removal regardless of the facility of which the sutures were placed.     2. Please arrange for settlement of this account at the emergency registration.    3. All self-pay accounts are due in full at the time of treatment.  If you are unable to meet this obligation then payment is expected within 4-5 days.     4. If you have had radiology studies (CT, X-ray, Ultrasound, MRI), you have received a preliminary result during your emergency department visit. Please contact the radiology department [phone] to receive a copy of your final result. Please discuss the Final result with your primary physician or with the follow up physician provided.     Crisis Hotline:  [place] Crisis Hotline:  8-662-BZXMEYM or 1-711.259.2599  [place] Crisis Hotline:    1-523.856.7332 or 408-558-0591         ED Discharge Follow Up Questions    1. In order to provide you with very good care, we would like to follow up with a phone call in the next few days.  May we have your permission to contact you?     YES /  NO    2. What is the best phone number to call you? (       )_____-__________    3. What is the best time to call you?      Morning  /  Afternoon  /  Evening                   Patient Signature:  ____________________________________________________________    Date:  ____________________________________________________________

## 2017-07-18 NOTE — ED NOTES
Iv removed and dressing placed. Patient dressed self and assisted to discharge via wheelchair. Taken to phones to call for ride home. Patient verbalizes understanding of discharge instructions and will follow up with PCP for further treatment. NAD or deficits noted at time of discharge

## 2017-07-18 NOTE — ED NOTES
Pt to room via wheelchair. Patient changed to gown and placed up for eval by ERP. Pt states she always has pain and swelling to both legs but her left legs seems more swollen and painful today

## 2017-07-18 NOTE — DISCHARGE INSTRUCTIONS
Edema  Edema is an abnormal buildup of fluids. It is more common in your legs and thighs. Painless swelling of the feet and ankles is more likely as a person ages. It also is common in looser skin, like around your eyes.  HOME CARE   · Keep the affected body part above the level of the heart while lying down.  · Do not sit still or stand for a long time.  · Do not put anything right under your knees when you lie down.  · Do not wear tight clothes on your upper legs.  · Exercise your legs to help the puffiness (swelling) go down.  · Wear elastic bandages or support stockings as told by your doctor.  · A low-salt diet may help lessen the puffiness.  · Only take medicine as told by your doctor.  GET HELP IF:  · Treatment is not working.  · You have heart, liver, or kidney disease and notice that your skin looks puffy or shiny.  · You have puffiness in your legs that does not get better when you raise your legs.  · You have sudden weight gain for no reason.  GET HELP RIGHT AWAY IF:   · You have shortness of breath or chest pain.  · You cannot breathe when you lie down.  · You have pain, redness, or warmth in the areas that are puffy.  · You have heart, liver, or kidney disease and get edema all of a sudden.  · You have a fever and your symptoms get worse all of a sudden.  MAKE SURE YOU:   · Understand these instructions.  · Will watch your condition.  · Will get help right away if you are not doing well or get worse.     This information is not intended to replace advice given to you by your health care provider. Make sure you discuss any questions you have with your health care provider.     Document Released: 06/05/2009 Document Revised: 12/23/2014 Document Reviewed: 10/10/2014  [employer] Interactive Patient Education ©2016 [employer]    Hyperglycemia  High blood sugar (hyperglycemia) means that the level of sugar in your blood is higher than it should be. Signs of high blood sugar include:  · Feeling  thirsty.  · Frequent peeing (urinating).  · Feeling tired or sleepy.  · Dry mouth.  · Vision changes.  · Feeling weak.  · Feeling hungry but losing weight.  · Numbness and tingling in your hands or feet.  · Headache.  When you ignore these signs, your blood sugar may keep going up. These problems may get worse, and other problems may begin.  HOME CARE  · Check your blood sugars as told by your doctor. Write down the numbers with the date and time.  · Take the right amount of insulin or diabetes pills at the right time. Write down the dose with date and time.  · Refill your insulin or diabetes pills before running out.  · Watch what you eat. Follow your meal plan.  · Drink liquids without sugar, such as water. Check with your doctor if you have kidney or heart disease.  · Follow your doctor's orders for exercise. Exercise at the same time of day.  · Keep your doctor's appointments.  GET HELP RIGHT AWAY IF:   · You have trouble thinking or are confused.  · You have fast breathing with fruity smelling breath.  · You pass out (faint).  · You have 2 to 3 days of high blood sugars and you do not know why.  · You have chest pain.  · You are feeling sick to your stomach (nauseous) or throwing up (vomiting).  · You have sudden vision changes.  MAKE SURE YOU:   · Understand these instructions.  · Will watch your condition.  · Will get help right away if you are not doing well or get worse.     This information is not intended to replace advice given to you by your health care provider. Make sure you discuss any questions you have with your health care provider.     Document Released: 10/15/2010 Document Revised: 01/08/2016 Document Reviewed: 10/15/2010  [employer] Interactive Patient Education ©2016 [employer]

## 2017-07-19 ENCOUNTER — PATIENT OUTREACH (OUTPATIENT)
Dept: HEALTH INFORMATION MANAGEMENT | Facility: OTHER | Age: 52
End: 2017-07-19

## 2017-07-26 NOTE — ED AVS SNAPSHOT
IdeaOffer Access Code: [email or web address]  Expires: 8/17/2017  2:50 PM    Your email address is not on file at [facility].  Email Addresses are required for you to sign up for IdeaOffer, please contact [phone] to verify your personal information and to provide your email address prior to attempting to register for IdeaOffer.    Nohemy Kimja  [address] [place] [address]    IdeaOffer  A secure, online tool to manage your health information     [facility]’s IdeaOffer® is a secure, online tool that connects you to your personalized health information from the privacy of your home -- day or night - making it very easy for you to manage your healthcare. Once the activation process is completed, you can even access your medical information using the IdeaOffer dheeraj, which is available for free in the Apple Dheeraj store or Google Play store.     To learn more about IdeaOffer, visit [email or web address]    There are two levels of access available (as shown below):   My Chart Features  [facility] Primary Care Doctor [facility]  Specialists [facility]  Urgent  Care Non-[facility] Primary Care Doctor   Email your healthcare team securely and privately 24/7 X X X    Manage appointments: schedule your next appointment; view details of past/upcoming appointments X      Request prescription refills. X      View recent personal medical records, including lab and immunizations X X X X   View health record, including health history, allergies, medications X X X X   Read reports about your outpatient visits, procedures, consult and ER notes X X X X   See your discharge summary, which is a recap of your hospital and/or ER visit that includes your diagnosis, lab results, and care plan X X  X     How to register for [email or web address]:  Once your e-mail address has been verified, follow the following steps to sign up for IdeaOffer.     1. Go to  [email or web address]  2. Click on the Sign Up Now box, which takes you to the New Member Sign Up page. You will need to  provide the following information:  a. Enter your [email or web address] Access Code exactly as it appears at the top of this page. (You will not need to use this code after you’ve completed the sign-up process. If you do not sign up before the expiration date, you must request a new code.)   b. Enter your date of birth.   c. Enter your home email address.   d. Click Submit, and follow the next screen’s instructions.  3. Create a [email or web address] ID. This will be your [email or web address] login ID and cannot be changed, so think of one that is secure and easy to remember.  4. Create a [email or web address] password. You can change your password at any time.  5. Enter your Password Reset Question and Answer. This can be used at a later time if you forget your password.   6. Enter your e-mail address. This allows you to receive e-mail notifications when new information is available in [email or web address].  7. Click Sign Up. You can now view your health information.    For assistance activating your [email or web address] account, call [phone]          Area L Indication Text: Tumors in this location are included in Area L (trunk and extremities).  Mohs surgery is indicated for larger tumors, or tumors with aggressive histologic features, in these anatomic locations.

## 2017-08-15 ENCOUNTER — APPOINTMENT (OUTPATIENT)
Dept: RADIOLOGY | Facility: MEDICAL CENTER | Age: 52
End: 2017-08-15
Attending: EMERGENCY MEDICINE
Payer: MEDICARE

## 2017-08-15 ENCOUNTER — HOSPITAL ENCOUNTER (EMERGENCY)
Facility: MEDICAL CENTER | Age: 52
End: 2017-08-15
Attending: EMERGENCY MEDICINE
Payer: MEDICARE

## 2017-08-15 VITALS
SYSTOLIC BLOOD PRESSURE: 109 MMHG | BODY MASS INDEX: 31.91 KG/M2 | HEART RATE: 96 BPM | WEIGHT: 180.12 LBS | TEMPERATURE: 96.5 F | HEIGHT: 63 IN | RESPIRATION RATE: 16 BRPM | DIASTOLIC BLOOD PRESSURE: 74 MMHG | OXYGEN SATURATION: 99 %

## 2017-08-15 DIAGNOSIS — E11.8 TYPE 2 DIABETES MELLITUS WITH COMPLICATION, UNSPECIFIED LONG TERM INSULIN USE STATUS: ICD-10-CM

## 2017-08-15 DIAGNOSIS — B02.9 HERPES ZOSTER WITHOUT COMPLICATION: ICD-10-CM

## 2017-08-15 LAB
APPEARANCE UR: ABNORMAL
APTT PPP: 29.9 SEC (ref 24.7–36)
BACTERIA #/AREA URNS HPF: ABNORMAL /HPF
BILIRUB UR QL STRIP.AUTO: NEGATIVE
BNP SERPL-MCNC: 112 PG/ML (ref 0–100)
COLOR UR: YELLOW
EKG IMPRESSION: NORMAL
EPI CELLS #/AREA URNS HPF: NEGATIVE /HPF
GLUCOSE UR STRIP.AUTO-MCNC: >=1000 MG/DL
HCG UR QL: NEGATIVE
HYALINE CASTS #/AREA URNS LPF: ABNORMAL /LPF
INR PPP: 0.96 (ref 0.87–1.13)
KETONES UR STRIP.AUTO-MCNC: NEGATIVE MG/DL
LEUKOCYTE ESTERASE UR QL STRIP.AUTO: NEGATIVE
MICRO URNS: ABNORMAL
NITRITE UR QL STRIP.AUTO: POSITIVE
PH UR STRIP.AUTO: 5.5 [PH]
PROT UR QL STRIP: >=1000 MG/DL
PROTHROMBIN TIME: 13.1 SEC (ref 12–14.6)
RBC # URNS HPF: ABNORMAL /HPF
RBC UR QL AUTO: ABNORMAL
SP GR UR REFRACTOMETRY: 1.02
SP GR UR STRIP.AUTO: 1.02
TROPONIN I SERPL-MCNC: <0.01 NG/ML (ref 0–0.04)
UROBILINOGEN UR STRIP.AUTO-MCNC: 0.2 MG/DL
WBC #/AREA URNS HPF: ABNORMAL /HPF

## 2017-08-15 PROCEDURE — 83880 ASSAY OF NATRIURETIC PEPTIDE: CPT

## 2017-08-15 PROCEDURE — 93005 ELECTROCARDIOGRAM TRACING: CPT

## 2017-08-15 PROCEDURE — 36415 COLL VENOUS BLD VENIPUNCTURE: CPT

## 2017-08-15 PROCEDURE — 99284 EMERGENCY DEPT VISIT MOD MDM: CPT

## 2017-08-15 PROCEDURE — 700102 HCHG RX REV CODE 250 W/ 637 OVERRIDE(OP): Performed by: EMERGENCY MEDICINE

## 2017-08-15 PROCEDURE — 81025 URINE PREGNANCY TEST: CPT

## 2017-08-15 PROCEDURE — 85730 THROMBOPLASTIN TIME PARTIAL: CPT

## 2017-08-15 PROCEDURE — 84484 ASSAY OF TROPONIN QUANT: CPT

## 2017-08-15 PROCEDURE — 81001 URINALYSIS AUTO W/SCOPE: CPT

## 2017-08-15 PROCEDURE — 85610 PROTHROMBIN TIME: CPT

## 2017-08-15 PROCEDURE — A9270 NON-COVERED ITEM OR SERVICE: HCPCS | Performed by: EMERGENCY MEDICINE

## 2017-08-15 RX ORDER — VALACYCLOVIR HYDROCHLORIDE 500 MG/1
1000 TABLET, FILM COATED ORAL ONCE
Status: COMPLETED | OUTPATIENT
Start: 2017-08-15 | End: 2017-08-15

## 2017-08-15 RX ORDER — NITROFURANTOIN 25; 75 MG/1; MG/1
100 CAPSULE ORAL 2 TIMES DAILY
Qty: 6 CAP | Refills: 0 | Status: SHIPPED | OUTPATIENT
Start: 2017-08-15 | End: 2017-08-15

## 2017-08-15 RX ORDER — ACETAMINOPHEN 325 MG/1
1000 TABLET ORAL ONCE
Status: DISCONTINUED | OUTPATIENT
Start: 2017-08-15 | End: 2017-08-15 | Stop reason: HOSPADM

## 2017-08-15 RX ORDER — NITROFURANTOIN 25; 75 MG/1; MG/1
100 CAPSULE ORAL 2 TIMES DAILY
Qty: 6 CAP | Refills: 0 | Status: SHIPPED | OUTPATIENT
Start: 2017-08-15 | End: 2017-08-18

## 2017-08-15 RX ORDER — OXYCODONE AND ACETAMINOPHEN 10; 325 MG/1; MG/1
1 TABLET ORAL ONCE
Status: COMPLETED | OUTPATIENT
Start: 2017-08-15 | End: 2017-08-15

## 2017-08-15 RX ORDER — VALACYCLOVIR HYDROCHLORIDE 1 G/1
1000 TABLET, FILM COATED ORAL 3 TIMES DAILY
Qty: 21 TAB | Refills: 0 | Status: SHIPPED | OUTPATIENT
Start: 2017-08-15 | End: 2017-08-22

## 2017-08-15 RX ADMIN — VALACYCLOVIR 1000 MG: 500 TABLET, FILM COATED ORAL at 21:23

## 2017-08-15 RX ADMIN — OXYCODONE HYDROCHLORIDE AND ACETAMINOPHEN 1 TABLET: 10; 325 TABLET ORAL at 21:23

## 2017-08-15 ASSESSMENT — PAIN SCALES - GENERAL
PAINLEVEL_OUTOF10: 7
PAINLEVEL_OUTOF10: 6

## 2017-08-15 ASSESSMENT — LIFESTYLE VARIABLES: DO YOU DRINK ALCOHOL: NO

## 2017-08-15 NOTE — ED AVS SNAPSHOT
Home Care Instructions                                                                                                                Nohemy Baumann   MRN: [id number]    Department:  [facility], Emergency Dept   Date of Visit:  8/15/2017            [facility], Emergency Dept    [address] [place] [id number]    Phone:  [phone]      You were seen by     hSeryl Reyes M.D.      Your Diagnosis Was     Herpes zoster without complication     B02.9       These are the medications you received during your hospitalization from 08/15/2017 1359 to 08/15/2017 2126     Date/Time Order Dose Route Action    08/15/2017 2123 valacyclovir (VALTREX) caplet 1,000 mg 1,000 mg Oral Given    08/15/2017 2123 acetaminophen (TYLENOL) tablet 975 mg 975 mg Oral Refused    08/15/2017 2123 oxycodone-acetaminophen (PERCOCET-10)  MG per tablet 1 Tab 1 Tab Oral Given      Follow-up Information     1. Follow up with Mohsen Tamasaby, M.D.. Call in 1 day.    Specialty:  Family Medicine    Why:  for close follow-up and pain management referral    Contact information    [address]  [facility] [address]  [phone]          2. Go to [facility], Emergency Dept.    Specialty:  Emergency Medicine    Why:  If symptoms worsen    Contact information    [address] [place] [id number] [phone]      Medication Information     Review all of your home medications and newly ordered medications with your primary doctor and/or pharmacist as soon as possible. Follow medication instructions as directed by your doctor and/or pharmacist.     Please keep your complete medication list with you and share with your physician. Update the information when medications are discontinued, doses are changed, or new medications (including over-the-counter products) are added; and carry medication information at all times in the event of emergency situations.                  Medication List      START taking these medications        Instructions    Morning Afternoon Evening Bedtime    valacyclovir 1 GM Tabs   Last time this was given:  1,000 mg on 8/15/2017  9:23 PM   Commonly known as:  VALTREX        Take 1 Tab by mouth 3 times a day for 7 days.   Dose:  1000 mg                          ASK your doctor about these medications        Instructions    Morning Afternoon Evening Bedtime    alprazolam 0.5 MG Tabs   Commonly known as:  XANAX        Take 0.5 mg by mouth at bedtime as needed for Sleep.   Dose:  0.5 mg                        amlodipine 5 MG Tabs   Commonly known as:  NORVASC        Take 5 mg by mouth every day.   Dose:  5 mg                        aspirin 81 MG tablet        Take 81 mg by mouth every day.   Dose:  81 mg                        atorvastatin 80 MG tablet   Commonly known as:  LIPITOR        Take 80 mg by mouth every evening.   Dose:  80 mg                        ELIQUIS 5mg Tabs   Generic drug:  apixaban        Take 5 mg by mouth 2 Times a Day.   Dose:  5 mg                        * furosemide 20 MG Tabs   Commonly known as:  LASIX        Take 20 mg by mouth 2 times a day.   Dose:  20 mg                        * furosemide 40 MG Tabs   Commonly known as:  LASIX        Take 1 Tab by mouth every morning.   Dose:  40 mg                        hydrocodone-acetaminophen 5-325 MG Tabs per tablet   Commonly known as:  NORCO        Take 1-2 Tabs by mouth every 8 hours as needed (pain).   Dose:  1-2 Tab                        LEVEMIR FLEXPEN 100 UNIT/ML Sopn injection   Generic drug:  insulin detemir        Inject 20 Units as instructed 2 times a day.   Dose:  20 Units                        metoprolol 25 MG Tabs   Commonly known as:  LOPRESSOR        Take 25 mg by mouth 2 times a day.   Dose:  25 mg                        NOVOLOG (insulin aspart) 100 UNIT/ML Sopn injection   Commonly known as:  NOVOLOG FLEXPEN        Use TID AC per sliding scale.  70   - 150  mg/dL =       0 Units  151 - 200  mg/dL =    2 Units  201 - 250  mg/dL =    3 Units  251 - 300  mg/dL  =   5 Units  301 - 350  mg/dL  =   6 Units  351 - 400 mg/dL   =   8 Units  Over 400 mg/dL   =   9 Units                        omeprazole 20 MG delayed-release capsule   Commonly known as:  PRILOSEC        Take 1 Cap by mouth every day.   Dose:  20 mg                        oxycodone-acetaminophen 5-325 MG Tabs   Commonly known as:  PERCOCET        Take 1-2 Tabs by mouth every four hours as needed.   Dose:  1-2 Tab                        vitamin D 1000 UNIT Tabs   Commonly known as:  cholecalciferol        Take 1,000 Units by mouth every day.   Dose:  1000 Units                        * Notice:  This list has 2 medication(s) that are the same as other medications prescribed for you. Read the directions carefully, and ask your doctor or other care provider to review them with you.         Where to Get Your Medications      You can get these medications from any pharmacy     Bring a paper prescription for each of these medications    - valacyclovir 1 GM Tabs            Procedures and tests performed during your visit     Procedure/Test Number of Times Performed    APTT 1    BETA-HCG QUALITATIVE URINE 1    Btype Natriuretic Peptide 1    CARDIAC MONITORING 1    Cardiac Monitoring 1    DX-CHEST-LIMITED (1 VIEW) 1    EKG (ER) 2    Maintain O2 sats greater than 94% 1    O2 Protocol 1    Oxygen Therapy per Protocol 1    Prothrombin Time 1    REFRACTOMETER SG 1    SALINE LOCK 1    Saline Lock 1    Troponin 1    URINALYSIS 1        Discharge Instructions       Please follow-up with your primary care physician. Due to your chronic opiate use he will likely require pain management specialist for control of pain due to your shingles. Do not exceed the maximum recommended dosage of your oxycodone. Return to the emergency department if he develops new or worsening symptoms including worsening pain, shortness of breath, nausea or vomiting, or  any further concerns. You may take 2 tablets of 5 mg oxycodone up to every 4 hours as needed for pain. Please do not exceed this dosage. Please contact your primary care physician for refill of this medication.    Shingles  Shingles, which is also known as herpes zoster, is an infection that causes a painful skin rash and fluid-filled blisters. Shingles is not related to genital herpes, which is a sexually transmitted infection.     Shingles only develops in people who:  · Have had chickenpox.  · Have received the chickenpox vaccine. (This is rare.)  CAUSES  Shingles is caused by varicella-zoster virus (VZV). This is the same virus that causes chickenpox. After exposure to VZV, the virus stays in the body in an inactive (dormant) state. Shingles develops if the virus reactivates. This can happen many years after the initial exposure to VZV. It is not known what causes this virus to reactivate.  RISK FACTORS  People who have had chickenpox or received the chickenpox vaccine are at risk for shingles. Infection is more common in people who:  · Are older than age 50.  · Have a weakened defense (immune) system, such as those with HIV, AIDS, or cancer.  · Are taking medicines that weaken the immune system, such as transplant medicines.  · Are under great stress.  SYMPTOMS  Early symptoms of this condition include itching, tingling, and pain in an area on your skin. Pain may be described as burning, stabbing, or throbbing.  A few days or weeks after symptoms start, a painful red rash appears, usually on one side of the body in a bandlike or beltlike pattern. The rash eventually turns into fluid-filled blisters that break open, scab over, and dry up in about 2-3 weeks.  At any time during the infection, you may also develop:  · A fever.  · Chills.  · A headache.  · An upset stomach.  DIAGNOSIS  This condition is diagnosed with a skin exam. Sometimes, skin or fluid samples are taken from the blisters before a diagnosis is  made. These samples are examined under a microscope or sent to a lab for testing.  TREATMENT  There is no specific cure for this condition. Your health care provider will probably prescribe medicines to help you manage pain, recover more quickly, and avoid long-term problems. Medicines may include:  · Antiviral drugs.  · Anti-inflammatory drugs.  · Pain medicines.  If the area involved is on your face, you may be referred to a specialist, such as an eye doctor (ophthalmologist) or an ear, nose, and throat (ENT) doctor to help you avoid eye problems, chronic pain, or disability.  HOME CARE INSTRUCTIONS  Medicines  · Take medicines only as directed by your health care provider.  · Apply an anti-itch or numbing cream to the affected area as directed by your health care provider.  Blister and Rash Care  · Take a cool bath or apply cool compresses to the area of the rash or blisters as directed by your health care provider. This may help with pain and itching.  · Keep your rash covered with a loose bandage (dressing). Wear loose-fitting clothing to help ease the pain of material rubbing against the rash.  · Keep your rash and blisters clean with mild soap and cool water or as directed by your health care provider.  · Check your rash every day for signs of infection. These include redness, swelling, and pain that lasts or increases.  · Do not pick your blisters.  · Do not scratch your rash.  General Instructions  · Rest as directed by your health care provider.  · Keep all follow-up visits as directed by your health care provider. This is important.  · Until your blisters scab over, your infection can cause chickenpox in people who have never had it or been vaccinated against it. To prevent this from happening, avoid contact with other people, especially:  ¨ Babies.  ¨ Pregnant women.  ¨ Children who have eczema.  ¨ Elderly people who have transplants.  ¨ People who have chronic illnesses, such as leukemia or AIDS.  SEEK  MEDICAL CARE IF:  · Your pain is not relieved with prescribed medicines.  · Your pain does not get better after the rash heals.  · Your rash looks infected. Signs of infection include redness, swelling, and pain that lasts or increases.  SEEK IMMEDIATE MEDICAL CARE IF:  · The rash is on your face or nose.  · You have facial pain, pain around your eye area, or loss of feeling on one side of your face.  · You have ear pain or you have ringing in your ear.  · You have loss of taste.  · Your condition gets worse.     This information is not intended to replace advice given to you by your health care provider. Make sure you discuss any questions you have with your health care provider.     Document Released: 12/18/2006 Document Revised: 01/08/2016 Document Reviewed: 10/29/2015  [employer] Interactive Patient Education ©2016 [employer]            Patient Information     Patient Information    Following emergency treatment: all patient requiring follow-up care must return either to a private physician or a clinic if your condition worsens before you are able to obtain further medical attention, please return to the emergency room.     Billing Information    At [facility], we work to make the billing process streamlined for our patients.  Our Representatives are here to answer any questions you may have regarding your hospital bill.  If you have insurance coverage and have supplied your insurance information to us, we will submit a claim to your insurer on your behalf.  Should you have any questions regarding your bill, we can be reached online or by phone as follows:  Online: You are able pay your bills online or live chat with our representatives about any billing questions you may have. We are here to help Monday - Friday from 8:00am to 7:30pm and 9:00am - 12:00pm on Saturdays.  Please visit [email or web address]  for more information.   Phone:  [phone] or [phone]    Please  note that your emergency physician, surgeon, pathologist, radiologist, anesthesiologist, and other specialists are not employed by [facility] and will therefore bill separately for their services.  Please contact them directly for any questions concerning their bills at the numbers below:     Emergency Physician Services:  [phone]  [facility]:  [phone]  [facility]:  [phone]  [facility]:  [phone]    1. Your final bill may vary from the amount quoted upon discharge if all procedures are not complete at that time, or if your doctor has additional procedures of which we are not aware. You will receive an additional bill if you return to the Emergency Department at [facility] for suture removal regardless of the facility of which the sutures were placed.     2. Please arrange for settlement of this account at the emergency registration.    3. All self-pay accounts are due in full at the time of treatment.  If you are unable to meet this obligation then payment is expected within 4-5 days.     4. If you have had radiology studies (CT, X-ray, Ultrasound, MRI), you have received a preliminary result during your emergency department visit. Please contact the radiology department [phone] to receive a copy of your final result. Please discuss the Final result with your primary physician or with the follow up physician provided.     Crisis Hotline:  [place] Crisis Hotline:  2-859-WOLIHKU or 1-704.756.2106  [place] Crisis Hotline:    1-657.841.1276 or 852-703-8079         ED Discharge Follow Up Questions    1. In order to provide you with very good care, we would like to follow up with a phone call in the next few days.  May we have your permission to contact you?     YES /  NO    2. What is the best phone number to call you? (       )_____-__________    3. What is the best time to call you?      Morning  /  Afternoon  /  Evening                    Patient Signature:  ____________________________________________________________    Date:  ____________________________________________________________

## 2017-08-15 NOTE — ED AVS SNAPSHOT
8/15/2017    Nohemy Baumann  [address] [place] [address]    Dear Nohemy:    [facility] wants to ensure your discharge home is safe and you or your loved ones have had all of your questions answered regarding your care after you leave the hospital.    Below is a list of resources and contact information should you have any questions regarding your hospital stay, follow-up instructions, or active medical symptoms.    Questions or Concerns Regarding… Contact   Medical Questions Related to Your Discharge  (7 days a week, 8am-5pm) Contact a Nurse Care Coordinator   [phone]   Medical Questions Not Related to Your Discharge  (24 hours a day / 7 days a week)  Contact the Nurse Health Line   [phone]    Medications or Discharge Instructions Refer to your discharge packet   or contact your [facility] Primary Care Provider   [phone]   Follow-up Appointment(s) Schedule your appointment via [employer]   or contact Scheduling [phone]   Billing Review your statement via [employer]  or contact Billing [phone]   Medical Records Review your records via [employer]   or contact Medical Records [phone]     You may receive a telephone call within two days of discharge. This call is to make certain you understand your discharge instructions and have the opportunity to have any questions answered. You can also easily access your medical information, test results and upcoming appointments via the [employer]en free online health management tool. You can learn more and sign up at [employer]/Imp[employer] Driven. For assistance setting up your [employer] account, please call [phone].    Once again, we want to ensure your discharge home is safe and that you have a clear understanding of any next steps in your care. If you have any questions or concerns, please do not hesitate to contact us, we are here for you. Thank you for choosing [facility] for your healthcare needs.    Sincerely,    Your [facility] Healthcare Team

## 2017-08-15 NOTE — ED NOTES
Pt to triage with   Chief Complaint   Patient presents with   • LUQ Pain     last two weeks; N/V; no blood in vomit.   • Chest Pain     radiating from LUQ pain     Pt Informed regarding triage process and verbalized understanding to inform triage tech or RN for any changes in condition. Placed in lobby.

## 2017-08-15 NOTE — ED AVS SNAPSHOT
[employer] Access Code: [email or web address]  Expires: 8/17/2017  2:50 PM    Your email address is not on file at [facility].  Email Addresses are required for you to sign up for [employer], please contact [phone] to verify your personal information and to provide your email address prior to attempting to register for [employer].    Nohemy Kimja  [address] [place] [address]    [employer]  A secure, online tool to manage your health information     [facility]’s [employer]® is a secure, online tool that connects you to your personalized health information from the privacy of your home -- day or night - making it very easy for you to manage your healthcare. Once the activation process is completed, you can even access your medical information using the [employer] dheeraj, which is available for free in the Apple Dheeraj store or Google Play store.     To learn more about [employer], visit [email or web address]    There are two levels of access available (as shown below):   My Chart Features  [facility] Primary Care Doctor [facility]  Specialists [facility]  Urgent  Care Non-[facility] Primary Care Doctor   Email your healthcare team securely and privately 24/7 X X X    Manage appointments: schedule your next appointment; view details of past/upcoming appointments X      Request prescription refills. X      View recent personal medical records, including lab and immunizations X X X X   View health record, including health history, allergies, medications X X X X   Read reports about your outpatient visits, procedures, consult and ER notes X X X X   See your discharge summary, which is a recap of your hospital and/or ER visit that includes your diagnosis, lab results, and care plan X X  X     How to register for [employer]:  Once your e-mail address has been verified, follow the following steps to sign up for [employer].     1. Go to  [email or web address]  2. Click on the Sign Up Now box, which takes you to the New Member Sign Up page. You will need to  provide the following information:  a. Enter your [employer] Access Code exactly as it appears at the top of this page. (You will not need to use this code after you’ve completed the sign-up process. If you do not sign up before the expiration date, you must request a new code.)   b. Enter your date of birth.   c. Enter your home email address.   d. Click Submit, and follow the next screen’s instructions.  3. Create a [employer] ID. This will be your [employer] login ID and cannot be changed, so think of one that is secure and easy to remember.  4. Create a [employer] password. You can change your password at any time.  5. Enter your Password Reset Question and Answer. This can be used at a later time if you forget your password.   6. Enter your e-mail address. This allows you to receive e-mail notifications when new information is available in [employer].  7. Click Sign Up. You can now view your health information.    For assistance activating your [employer] account, call [phone]

## 2017-08-16 ENCOUNTER — PATIENT OUTREACH (OUTPATIENT)
Dept: HEALTH INFORMATION MANAGEMENT | Facility: OTHER | Age: 52
End: 2017-08-16

## 2017-08-16 NOTE — PROGRESS NOTES
· Placed discharge outreach phone call to patient s/p ER discharge 8/15/17.  Left voicemail providing my contact information and instructions to call with any questions or concerns.

## 2017-08-16 NOTE — ED NOTES
RN educated pt on discharge instructions, and prescriptions, pt assisted to lobby via daughter in her wheelchair.

## 2017-08-16 NOTE — ED PROVIDER NOTES
ED Provider Note    Scribed for Sheryl Reyes M.D. by Loy Parry. 8/15/2017, 8:34 PM.    Primary care provider: Mohsen Tamasaby, M.D.  Means of arrival: walk-in  History obtained from: patient  History limited by: none    CHIEF COMPLAINT  Chief Complaint   Patient presents with   • LUQ Pain     last two weeks; N/V; no blood in vomit.   • Chest Pain     radiating from LUQ pain       HPI  Nohemy Baumann is a 52 y.o. female who presents to the Emergency Department for evaluation of left sided pain below her breast onset two weeks ago. Patient reports she has been taking Percocet for her pain every six hours. The patient states the pain medication will only eliminate her pain for 30 minutes until it returns. Starting two days ago, her pain started radiating up to her chest and increasing in severity. The patient endorses associated shortness of breath. She adds her pain is worse in the morning, and worse at rest. The patient also notes left-sided back itching and rash onset 3-5 days ago. Patient reports her legs are chronically swollen with no recent changes. The patient confirms she is currently taking Eliquis because she had two stents placed in her heart in 2013. She is followed by Dr. Woodson (Cardiology). Patient also confirms a history of diabetes, hypertension. She denies any recent travel. She also denies fevers.  The assistance she denies dysuria, endorses cloudy appearing urine yesterday.    REVIEW OF SYSTEMS  Pertinent positives include left-sided chest/breast pain, shortness of breath, rash, itching, leg swelling (chronic), impaired immunity secondary to diabetes. Pertinent negatives include no fevers.  All other systems reviewed and negative.    C.    PAST MEDICAL HISTORY   has a past medical history of Hypertension; Diabetes (CMS-[facility]); Hypercholesteremia; DVT (deep venous thrombosis) (CMS-[facility]); CKD (chronic kidney disease) stage 3, GFR 30-59 ml/min; CAD (coronary artery disease); and GERD  (gastroesophageal reflux disease).    SURGICAL HISTORY  patient denies any surgical history    SOCIAL HISTORY  Social History   Substance Use Topics   • Smoking status: Current Some Day Smoker     Types: Cigarettes   • Smokeless tobacco: None   • Alcohol Use: No      History   Drug Use No       FAMILY HISTORY  History reviewed. No pertinent family history.    CURRENT MEDICATIONS  No current facility-administered medications on file prior to encounter.     Current Outpatient Prescriptions on File Prior to Encounter   Medication Sig Dispense Refill   • furosemide (LASIX) 40 MG Tab Take 1 Tab by mouth every morning. 60 Tab 0   • hydrocodone-acetaminophen (NORCO) 5-325 MG Tab per tablet Take 1-2 Tabs by mouth every 8 hours as needed (pain). 20 Tab 0   • omeprazole (PRILOSEC) 20 MG delayed-release capsule Take 1 Cap by mouth every day. 30 Cap 1   • aspirin 81 MG tablet Take 81 mg by mouth every day.     • insulin detemir (LEVEMIR FLEXPEN) 100 UNIT/ML Solution Pen-injector injection Inject 20 Units as instructed 2 times a day.     • NOVOLOG, insulin aspart, (NOVOLOG FLEXPEN) 100 UNIT/ML Solution Pen-injector injection Use TID AC per sliding scale.   70   - 150  mg/dL =      0 Units   151 - 200  mg/dL =    2 Units   201 - 250  mg/dL =    3 Units   251 - 300  mg/dL  =   5 Units   301 - 350  mg/dL  =   6 Units   351 - 400 mg/dL   =   8 Units   Over 400 mg/dL   =   9 Units 1 PEN 2   • vitamin D (CHOLECALCIFEROL) 1000 UNIT Tab Take 1,000 Units by mouth every day.     • amlodipine (NORVASC) 5 MG Tab Take 5 mg by mouth every day.     • metoprolol (LOPRESSOR) 25 MG Tab Take 25 mg by mouth 2 times a day.     • alprazolam (XANAX) 0.5 MG Tab Take 0.5 mg by mouth at bedtime as needed for Sleep.     • oxycodone-acetaminophen (PERCOCET) 5-325 MG Tab Take 1-2 Tabs by mouth every four hours as needed.     • furosemide (LASIX) 20 MG Tab Take 20 mg by mouth 2 times a day.     • atorvastatin (LIPITOR) 80 MG tablet Take 80 mg by mouth every  "evening.     • apixaban (ELIQUIS) 5mg Tab Take 5 mg by mouth 2 Times a Day.        ALLERGIES  Allergies   Allergen Reactions   • Nitroglycerin Hives     Nitro patch only, oral tablet is ok per pt   • Vancomycin Hives       PHYSICAL EXAM  Vital Signs: /74 mmHg  Pulse 86  Temp(Src) 35.8 °C (96.5 °F) (Temporal)  Resp 16  Ht 1.6 m (5' 3\")  Wt 81.7 kg (180 lb 1.9 oz)  BMI 31.91 kg/m2  SpO2 100%  Constitutional: Alert, no acute distress  HENT: Normocephalic, atraumatic, moist mucus membranes  Eyes: Pupils equal and reactive, normal conjunctiva, non-icteric  Neck: Supple, normal range of motion, no stridor  Cardiovascular: Regular rhythm, Normal peripheral perfusion, no cyanosis, Normal cardiac auscultation  Pulmonary: No respiratory distress, normal work of breathing, no accessory muscle usage, Clear to auscultation bilaterally  Abdomen: Soft, non tender, no peritoneal signs, bowel sounds are present.   Skin: Warm, dry. Vesicular rash along the left back and below the left breast in T5 dermatomal distribution.   Back: No pain with active range of motion  Musculoskeletal: Normal range of motion in all extremities, no swelling or deformity noted  Neurologic: Alert, oriented, normal motor function, no speech deficits  Psychiatric: Normal and appropriate mood and affect      DIAGNOSTIC STUDIES/PROCEDURES:    LABS  Labs Reviewed   BTYPE NATRIURETIC PEPTIDE - Abnormal; Notable for the following:     B Natriuretic Peptide 112 (*)     All other components within normal limits    Narrative:     Indicate which anticoagulants the patient is on:->UNKNOWN   URINALYSIS - Abnormal; Notable for the following:     Character Cloudy (*)     Glucose >=1000 (*)     Protein >=1000 (*)     Nitrite Positive (*)     Occult Blood Moderate (*)     All other components within normal limits   URINE MICROSCOPIC (W/UA) - Abnormal; Notable for the following:     WBC  (*)     RBC 5-10 (*)     Bacteria Many (*)     All other components " within normal limits   TROPONIN    Narrative:     Indicate which anticoagulants the patient is on:->UNKNOWN   PROTHROMBIN TIME    Narrative:     Indicate which anticoagulants the patient is on:->UNKNOWN   APTT    Narrative:     Indicate which anticoagulants the patient is on:->UNKNOWN   HCG QUALITATIVE UR   REFRACTOMETER SG     All labs reviewed by me.    EKG  12 Lead EKG interpreted by me to show:  Rate 93, normal sinus rhythm, no ST elevation or depression, as compared to prior EKG 6/5/17, no significant changes, T-wave inversions present in V1, V2 that are present on previous exam. No ectopy. Right bundle branch block present on prior exam.    COURSE & MEDICAL DECISION MAKING  Pertinent Labs & Imaging studies reviewed. (See chart for details)    Differential diagnoses include but are not limited to: ACS, pneumonia, musculoskeletal chest pain, shingles     4:48 PM - Ordered chest x-ray, POC Urine Pregnancy, POC UA, Troponin, BNP, PTT, APTT, EKG per protocol.     8:34 PM - Patient seen and examined at bedside. Patient will be treated with 1000 mg Valtrex. Ordered Beta HCG Qualitative Urine, UA,  to evaluate her symptoms. I discussed the treatment plan as above with the patient. She understood and verbalized agreement.     9:41 PM  - I reevaluated the patient at bedside. She is resting comfortably. The patient will be treated with 1 tab of  mg Percocet and 975 mg Tylenol prior to discharge. I updated the patient on her lab results as above. Patient's chest x-ray was canceled. I will send the patient home with a prescription for Valtrex and Macrobid. I instructed the patient to follow-up with her primary care physician, and to follow-up with a pain management specialist. I welcomed the patient to return to the ED with new or worsening symptoms.  She understood and verbalized agreement.     Reviewed the patient's prescription history on [place] Prescription Monitoring Program which showed the patient had 1  prescription for percocet filled two days ago    Decision Making:  This is a 52 y.o. year old female who presents with left-sided chest pain onset 2 weeks ago, progressively worsening now associated with rash. On physical exam she does have a dermatomal vesicular rash in the early stages of presentation. This is consistent with her persistent pain localized below the left breast radiating to the back. Screening labs ordered at triage, BNP without evidence of heart failure, troponin is undetectable, I do believe her pain is due to the shingles, it is been present without stuttering quality for 1-1/2 weeks, do not believe serial cardiac enzymes are indicated. She has no evidence of ischemia on EKG. urinalysis is nitrite positive, she has no dysuria but did complain of an abnormal appearance. She is treated with Macrobid. She is currently on Percocet as prescribed by another provider. I did provide her with slightly increased dosing and she is currently taking 5 mg every 4 hours.  record review notes that she filled her recent prescription 8/11/17. She'll follow up with her primary care physician tomorrow for continued pain management, I did explain that she may require specialist consultation for pain management if the opiates do not work. Additionally she is started on valacyclovir, 1st dose given in the emergency department, she is discharged with a prescription for 10 day supply.    Return precautions given including worsening chest pain, shortness of breath, fevers, worsening pain that is not controlled by her home medications or any further concerns.    The patient will return for new or worsening symptoms and is stable at the time of discharge.    The patient is referred to a primary physician for blood pressure management, diabetic screening, and for all other preventative health concerns.    DISPOSITION:  Patient will be discharged home in stable condition.    FOLLOW UP:  Mohsen Tamasaby, M.D.  [address]  [facility] [address]  [place] [address]  [phone]    Call in 1 day  for close follow-up and pain management referral    [facility], Emergency Dept  [address]  [place] [id number] [phone]  Go to  If symptoms worsen      OUTPATIENT MEDICATIONS:  Discharge Medication List as of 8/15/2017  9:26 PM      START taking these medications    Details   valacyclovir (VALTREX) 1 GM Tab Take 1 Tab by mouth 3 times a day for 7 days., Disp-21 Tab, R-0, Print Rx Paper                FINAL IMPRESSION  1. Herpes zoster without complication    2. Type 2 diabetes mellitus with complication, unspecified long term insulin use status (CMS-[facility])          ILoy (Scribe), am scribing for, and in the presence of, Sheryl Reyes M.D..    Electronically signed by: Loy Parry (Scribe), 8/15/2017    ISheryl M.D. personally performed the services described in this documentation, as scribed by Lyo Parry in my presence, and it is both accurate and complete.    The note accurately reflects work and decisions made by me.  Sheryl Reyes  8/15/2017  10:19 PM

## 2017-08-16 NOTE — DISCHARGE INSTRUCTIONS
Please follow-up with your primary care physician. Due to your chronic opiate use he will likely require pain management specialist for control of pain due to your shingles. Do not exceed the maximum recommended dosage of your oxycodone. Return to the emergency department if he develops new or worsening symptoms including worsening pain, shortness of breath, nausea or vomiting, or any further concerns. You may take 2 tablets of 5 mg oxycodone up to every 4 hours as needed for pain. Please do not exceed this dosage. Please contact your primary care physician for refill of this medication.    Shingles  Shingles, which is also known as herpes zoster, is an infection that causes a painful skin rash and fluid-filled blisters. Shingles is not related to genital herpes, which is a sexually transmitted infection.     Shingles only develops in people who:  · Have had chickenpox.  · Have received the chickenpox vaccine. (This is rare.)  CAUSES  Shingles is caused by varicella-zoster virus (VZV). This is the same virus that causes chickenpox. After exposure to VZV, the virus stays in the body in an inactive (dormant) state. Shingles develops if the virus reactivates. This can happen many years after the initial exposure to VZV. It is not known what causes this virus to reactivate.  RISK FACTORS  People who have had chickenpox or received the chickenpox vaccine are at risk for shingles. Infection is more common in people who:  · Are older than age 50.  · Have a weakened defense (immune) system, such as those with HIV, AIDS, or cancer.  · Are taking medicines that weaken the immune system, such as transplant medicines.  · Are under great stress.  SYMPTOMS  Early symptoms of this condition include itching, tingling, and pain in an area on your skin. Pain may be described as burning, stabbing, or throbbing.  A few days or weeks after symptoms start, a painful red rash appears, usually on one side of the body in a bandlike or  beltlike pattern. The rash eventually turns into fluid-filled blisters that break open, scab over, and dry up in about 2-3 weeks.  At any time during the infection, you may also develop:  · A fever.  · Chills.  · A headache.  · An upset stomach.  DIAGNOSIS  This condition is diagnosed with a skin exam. Sometimes, skin or fluid samples are taken from the blisters before a diagnosis is made. These samples are examined under a microscope or sent to a lab for testing.  TREATMENT  There is no specific cure for this condition. Your health care provider will probably prescribe medicines to help you manage pain, recover more quickly, and avoid long-term problems. Medicines may include:  · Antiviral drugs.  · Anti-inflammatory drugs.  · Pain medicines.  If the area involved is on your face, you may be referred to a specialist, such as an eye doctor (ophthalmologist) or an ear, nose, and throat (ENT) doctor to help you avoid eye problems, chronic pain, or disability.  HOME CARE INSTRUCTIONS  Medicines  · Take medicines only as directed by your health care provider.  · Apply an anti-itch or numbing cream to the affected area as directed by your health care provider.  Blister and Rash Care  · Take a cool bath or apply cool compresses to the area of the rash or blisters as directed by your health care provider. This may help with pain and itching.  · Keep your rash covered with a loose bandage (dressing). Wear loose-fitting clothing to help ease the pain of material rubbing against the rash.  · Keep your rash and blisters clean with mild soap and cool water or as directed by your health care provider.  · Check your rash every day for signs of infection. These include redness, swelling, and pain that lasts or increases.  · Do not pick your blisters.  · Do not scratch your rash.  General Instructions  · Rest as directed by your health care provider.  · Keep all follow-up visits as directed by your health care provider. This is  important.  · Until your blisters scab over, your infection can cause chickenpox in people who have never had it or been vaccinated against it. To prevent this from happening, avoid contact with other people, especially:  ¨ Babies.  ¨ Pregnant women.  ¨ Children who have eczema.  ¨ Elderly people who have transplants.  ¨ People who have chronic illnesses, such as leukemia or AIDS.  SEEK MEDICAL CARE IF:  · Your pain is not relieved with prescribed medicines.  · Your pain does not get better after the rash heals.  · Your rash looks infected. Signs of infection include redness, swelling, and pain that lasts or increases.  SEEK IMMEDIATE MEDICAL CARE IF:  · The rash is on your face or nose.  · You have facial pain, pain around your eye area, or loss of feeling on one side of your face.  · You have ear pain or you have ringing in your ear.  · You have loss of taste.  · Your condition gets worse.     This information is not intended to replace advice given to you by your health care provider. Make sure you discuss any questions you have with your health care provider.     Document Released: 12/18/2006 Document Revised: 01/08/2016 Document Reviewed: 10/29/2015  [employer] Interactive Patient Education ©2016 [employer]

## 2017-10-02 ENCOUNTER — APPOINTMENT (OUTPATIENT)
Dept: RADIOLOGY | Facility: MEDICAL CENTER | Age: 52
End: 2017-10-02
Payer: MEDICARE

## 2017-10-02 ENCOUNTER — HOSPITAL ENCOUNTER (EMERGENCY)
Facility: MEDICAL CENTER | Age: 52
End: 2017-10-02
Payer: MEDICARE

## 2017-10-02 VITALS
TEMPERATURE: 97.5 F | WEIGHT: 180 LBS | RESPIRATION RATE: 16 BRPM | OXYGEN SATURATION: 100 % | HEART RATE: 94 BPM | HEIGHT: 63 IN | SYSTOLIC BLOOD PRESSURE: 136 MMHG | BODY MASS INDEX: 31.89 KG/M2 | DIASTOLIC BLOOD PRESSURE: 89 MMHG

## 2017-10-02 LAB
ALBUMIN SERPL BCP-MCNC: 2.3 G/DL (ref 3.2–4.9)
ALBUMIN/GLOB SERPL: 0.9 G/DL
ALP SERPL-CCNC: 74 U/L (ref 30–99)
ALT SERPL-CCNC: 10 U/L (ref 2–50)
ANION GAP SERPL CALC-SCNC: 8 MMOL/L (ref 0–11.9)
AST SERPL-CCNC: 15 U/L (ref 12–45)
BASOPHILS # BLD AUTO: 0.5 % (ref 0–1.8)
BASOPHILS # BLD: 0.03 K/UL (ref 0–0.12)
BILIRUB SERPL-MCNC: 0.3 MG/DL (ref 0.1–1.5)
BNP SERPL-MCNC: 88 PG/ML (ref 0–100)
BUN SERPL-MCNC: 27 MG/DL (ref 8–22)
CALCIUM SERPL-MCNC: 8.2 MG/DL (ref 8.5–10.5)
CHLORIDE SERPL-SCNC: 107 MMOL/L (ref 96–112)
CO2 SERPL-SCNC: 20 MMOL/L (ref 20–33)
CREAT SERPL-MCNC: 1.38 MG/DL (ref 0.5–1.4)
EKG IMPRESSION: NORMAL
EOSINOPHIL # BLD AUTO: 0.19 K/UL (ref 0–0.51)
EOSINOPHIL NFR BLD: 3.3 % (ref 0–6.9)
ERYTHROCYTE [DISTWIDTH] IN BLOOD BY AUTOMATED COUNT: 41.4 FL (ref 35.9–50)
GFR SERPL CREATININE-BSD FRML MDRD: 40 ML/MIN/1.73 M 2
GLOBULIN SER CALC-MCNC: 2.7 G/DL (ref 1.9–3.5)
GLUCOSE SERPL-MCNC: 179 MG/DL (ref 65–99)
HCT VFR BLD AUTO: 31.1 % (ref 37–47)
HGB BLD-MCNC: 11.1 G/DL (ref 12–16)
IMM GRANULOCYTES # BLD AUTO: 0.01 K/UL (ref 0–0.11)
IMM GRANULOCYTES NFR BLD AUTO: 0.2 % (ref 0–0.9)
LYMPHOCYTES # BLD AUTO: 2.3 K/UL (ref 1–4.8)
LYMPHOCYTES NFR BLD: 39.5 % (ref 22–41)
MCH RBC QN AUTO: 32 PG (ref 27–33)
MCHC RBC AUTO-ENTMCNC: 35.7 G/DL (ref 33.6–35)
MCV RBC AUTO: 89.6 FL (ref 81.4–97.8)
MONOCYTES # BLD AUTO: 0.43 K/UL (ref 0–0.85)
MONOCYTES NFR BLD AUTO: 7.4 % (ref 0–13.4)
NEUTROPHILS # BLD AUTO: 2.87 K/UL (ref 2–7.15)
NEUTROPHILS NFR BLD: 49.1 % (ref 44–72)
NRBC # BLD AUTO: 0 K/UL
NRBC BLD AUTO-RTO: 0 /100 WBC
PLATELET # BLD AUTO: 157 K/UL (ref 164–446)
PMV BLD AUTO: 9.5 FL (ref 9–12.9)
POTASSIUM SERPL-SCNC: 4.6 MMOL/L (ref 3.6–5.5)
PROT SERPL-MCNC: 5 G/DL (ref 6–8.2)
RBC # BLD AUTO: 3.47 M/UL (ref 4.2–5.4)
SODIUM SERPL-SCNC: 135 MMOL/L (ref 135–145)
WBC # BLD AUTO: 5.8 K/UL (ref 4.8–10.8)

## 2017-10-02 PROCEDURE — 80053 COMPREHEN METABOLIC PANEL: CPT

## 2017-10-02 PROCEDURE — 87040 BLOOD CULTURE FOR BACTERIA: CPT

## 2017-10-02 PROCEDURE — 85025 COMPLETE CBC W/AUTO DIFF WBC: CPT

## 2017-10-02 PROCEDURE — 302449 STATCHG TRIAGE ONLY (STATISTIC)

## 2017-10-02 PROCEDURE — 83880 ASSAY OF NATRIURETIC PEPTIDE: CPT

## 2017-10-02 PROCEDURE — 93005 ELECTROCARDIOGRAM TRACING: CPT

## 2017-10-02 NOTE — ED NOTES
Chief Complaint   Patient presents with   • Cough   • Shortness of Breath     x3days     Pt wheeled to triage with above complaints.   Noted leg swelling but pt said that is normal.   Speaking full sentences.

## 2017-10-07 LAB
BACTERIA BLD CULT: NORMAL
SIGNIFICANT IND 70042: NORMAL
SITE SITE: NORMAL
SOURCE SOURCE: NORMAL

## 2021-03-15 DIAGNOSIS — Z23 NEED FOR VACCINATION: ICD-10-CM
